# Patient Record
Sex: MALE | Race: WHITE | NOT HISPANIC OR LATINO | Employment: FULL TIME | URBAN - METROPOLITAN AREA
[De-identification: names, ages, dates, MRNs, and addresses within clinical notes are randomized per-mention and may not be internally consistent; named-entity substitution may affect disease eponyms.]

---

## 2017-11-20 ENCOUNTER — HOSPITAL ENCOUNTER (OUTPATIENT)
Dept: LAB | Facility: MEDICAL CENTER | Age: 57
End: 2017-11-20
Attending: FAMILY MEDICINE
Payer: COMMERCIAL

## 2017-11-20 LAB
25(OH)D3 SERPL-MCNC: 26 NG/ML (ref 30–100)
ALBUMIN SERPL BCP-MCNC: 3.8 G/DL (ref 3.2–4.9)
ALBUMIN/GLOB SERPL: 1.2 G/DL
ALP SERPL-CCNC: 43 U/L (ref 30–99)
ALT SERPL-CCNC: 19 U/L (ref 2–50)
ANION GAP SERPL CALC-SCNC: 4 MMOL/L (ref 0–11.9)
AST SERPL-CCNC: 21 U/L (ref 12–45)
BASOPHILS # BLD AUTO: 1.5 % (ref 0–1.8)
BASOPHILS # BLD: 0.07 K/UL (ref 0–0.12)
BILIRUB SERPL-MCNC: 0.7 MG/DL (ref 0.1–1.5)
BUN SERPL-MCNC: 22 MG/DL (ref 8–22)
CALCIUM SERPL-MCNC: 9.5 MG/DL (ref 8.5–10.5)
CHLORIDE SERPL-SCNC: 104 MMOL/L (ref 96–112)
CHOLEST SERPL-MCNC: 231 MG/DL (ref 100–199)
CO2 SERPL-SCNC: 29 MMOL/L (ref 20–33)
CREAT SERPL-MCNC: 1 MG/DL (ref 0.5–1.4)
CRP SERPL HS-MCNC: 1.1 MG/L (ref 0–7.5)
EOSINOPHIL # BLD AUTO: 0.45 K/UL (ref 0–0.51)
EOSINOPHIL NFR BLD: 9.5 % (ref 0–6.9)
ERYTHROCYTE [DISTWIDTH] IN BLOOD BY AUTOMATED COUNT: 41.3 FL (ref 35.9–50)
GFR SERPL CREATININE-BSD FRML MDRD: >60 ML/MIN/1.73 M 2
GLOBULIN SER CALC-MCNC: 3.3 G/DL (ref 1.9–3.5)
GLUCOSE SERPL-MCNC: 93 MG/DL (ref 65–99)
HCT VFR BLD AUTO: 47.1 % (ref 42–52)
HDLC SERPL-MCNC: 56 MG/DL
HGB BLD-MCNC: 16 G/DL (ref 14–18)
IMM GRANULOCYTES # BLD AUTO: 0.01 K/UL (ref 0–0.11)
IMM GRANULOCYTES NFR BLD AUTO: 0.2 % (ref 0–0.9)
LDLC SERPL CALC-MCNC: 153 MG/DL
LYMPHOCYTES # BLD AUTO: 1.76 K/UL (ref 1–4.8)
LYMPHOCYTES NFR BLD: 37.2 % (ref 22–41)
MCH RBC QN AUTO: 30.9 PG (ref 27–33)
MCHC RBC AUTO-ENTMCNC: 34 G/DL (ref 33.7–35.3)
MCV RBC AUTO: 91.1 FL (ref 81.4–97.8)
MONOCYTES # BLD AUTO: 0.46 K/UL (ref 0–0.85)
MONOCYTES NFR BLD AUTO: 9.7 % (ref 0–13.4)
NEUTROPHILS # BLD AUTO: 1.98 K/UL (ref 1.82–7.42)
NEUTROPHILS NFR BLD: 41.9 % (ref 44–72)
NRBC # BLD AUTO: 0 K/UL
NRBC BLD AUTO-RTO: 0 /100 WBC
PLATELET # BLD AUTO: 282 K/UL (ref 164–446)
PMV BLD AUTO: 10.3 FL (ref 9–12.9)
POTASSIUM SERPL-SCNC: 4.5 MMOL/L (ref 3.6–5.5)
PROT SERPL-MCNC: 7.1 G/DL (ref 6–8.2)
PSA SERPL-MCNC: 0.42 NG/ML (ref 0–4)
RBC # BLD AUTO: 5.17 M/UL (ref 4.7–6.1)
SODIUM SERPL-SCNC: 137 MMOL/L (ref 135–145)
TRIGL SERPL-MCNC: 112 MG/DL (ref 0–149)
WBC # BLD AUTO: 4.7 K/UL (ref 4.8–10.8)

## 2017-11-20 PROCEDURE — 36415 COLL VENOUS BLD VENIPUNCTURE: CPT

## 2017-11-20 PROCEDURE — 80053 COMPREHEN METABOLIC PANEL: CPT

## 2017-11-20 PROCEDURE — 86141 C-REACTIVE PROTEIN HS: CPT

## 2017-11-20 PROCEDURE — 84153 ASSAY OF PSA TOTAL: CPT

## 2017-11-20 PROCEDURE — 80061 LIPID PANEL: CPT

## 2017-11-20 PROCEDURE — 85025 COMPLETE CBC W/AUTO DIFF WBC: CPT

## 2017-11-20 PROCEDURE — 82306 VITAMIN D 25 HYDROXY: CPT

## 2018-01-02 ENCOUNTER — HOSPITAL ENCOUNTER (OUTPATIENT)
Dept: RADIOLOGY | Facility: MEDICAL CENTER | Age: 58
End: 2018-01-02
Attending: ORTHOPAEDIC SURGERY
Payer: COMMERCIAL

## 2018-01-02 DIAGNOSIS — M75.121 COMPLETE TEAR OF RIGHT ROTATOR CUFF: ICD-10-CM

## 2018-01-02 PROCEDURE — 73221 MRI JOINT UPR EXTREM W/O DYE: CPT | Mod: RT

## 2019-02-07 ENCOUNTER — OFFICE VISIT (OUTPATIENT)
Dept: MEDICAL GROUP | Facility: PHYSICIAN GROUP | Age: 59
End: 2019-02-07
Payer: COMMERCIAL

## 2019-02-07 VITALS
TEMPERATURE: 98.5 F | BODY MASS INDEX: 27.77 KG/M2 | DIASTOLIC BLOOD PRESSURE: 74 MMHG | OXYGEN SATURATION: 99 % | SYSTOLIC BLOOD PRESSURE: 110 MMHG | HEIGHT: 72 IN | HEART RATE: 62 BPM | WEIGHT: 205 LBS

## 2019-02-07 DIAGNOSIS — M19.011 ARTHRITIS OF RIGHT SHOULDER REGION: ICD-10-CM

## 2019-02-07 DIAGNOSIS — E78.5 DYSLIPIDEMIA: ICD-10-CM

## 2019-02-07 DIAGNOSIS — E66.3 OVERWEIGHT (BMI 25.0-29.9): ICD-10-CM

## 2019-02-07 DIAGNOSIS — Z12.5 SCREENING FOR PROSTATE CANCER: ICD-10-CM

## 2019-02-07 DIAGNOSIS — N40.1 BENIGN PROSTATIC HYPERPLASIA WITH NOCTURIA: ICD-10-CM

## 2019-02-07 DIAGNOSIS — N52.9 ERECTILE DYSFUNCTION, UNSPECIFIED ERECTILE DYSFUNCTION TYPE: ICD-10-CM

## 2019-02-07 DIAGNOSIS — R35.1 BENIGN PROSTATIC HYPERPLASIA WITH NOCTURIA: ICD-10-CM

## 2019-02-07 PROCEDURE — 99204 OFFICE O/P NEW MOD 45 MIN: CPT | Performed by: FAMILY MEDICINE

## 2019-02-07 RX ORDER — TAMSULOSIN HYDROCHLORIDE 0.4 MG/1
0.4 CAPSULE ORAL
Qty: 30 CAP | Refills: 0 | Status: SHIPPED | OUTPATIENT
Start: 2019-02-07 | End: 2019-03-11 | Stop reason: SDUPTHER

## 2019-02-07 ASSESSMENT — PATIENT HEALTH QUESTIONNAIRE - PHQ9: CLINICAL INTERPRETATION OF PHQ2 SCORE: 0

## 2019-02-07 NOTE — ASSESSMENT & PLAN NOTE
New problem to examiner.  Patient with a history of right shoulder arthritis previously diagnosed on MRI by evaluation with Dr. Pinto.  Patient had since elected not to have any surgery done at this time and has had rare exacerbations of the shoulder arthritis.  States that he still able to golf and go to the gym as he would like.

## 2019-02-07 NOTE — ASSESSMENT & PLAN NOTE
Problem to examiner.  Patient previously tested for cholesterol by previous provider Dr. Stevie Burt.  Old records demonstrate elevated LDL cholesterol with HDL 55.  ASCVD risk of 6% for the next 10 years for heart attack or stroke with optimal risk of 4.1%.

## 2019-02-07 NOTE — PROGRESS NOTES
CC:  Diagnoses of Erectile dysfunction, unspecified erectile dysfunction type, Benign prostatic hyperplasia with nocturia, Dyslipidemia, Screening for prostate cancer, Overweight (BMI 25.0-29.9), and Arthritis of right shoulder region were pertinent to this visit.    HISTORY OF THE PRESENT ILLNESS: Patient is a 58 y.o. male. This pleasant patient is here today to establish new PCP.    Health Maintenance: Completed      Benign prostatic hyperplasia with nocturia  New problem to examiner.  Patient presents with 2 episodes of nocturia nightly and intermittent history of weak stream and polyuria if he drinks too much water during the day.  Denies any dysuria, foul-smelling urine, hematuria.    Erectile dysfunction  New problem to examiner.  Patient complains of difficulty obtaining and maintaining an erection.  States that when he does get an erection it does not last until termination of intercourse or climax.  And it does not as firm as previously experienced.    Dyslipidemia  Problem to examiner.  Patient previously tested for cholesterol by previous provider Dr. Stevie Burt.  Old records demonstrate elevated LDL cholesterol with HDL 55.  ASCVD risk of 6% for the next 10 years for heart attack or stroke with optimal risk of 4.1%.    Overweight (BMI 25.0-29.9)  New problem to examiner.  Patient with BMI of 27.  Likely related to high muscle mass.  Patient is active with golfing and exercise 3 days/week.    Arthritis of right shoulder region  New problem to examiner.  Patient with a history of right shoulder arthritis previously diagnosed on MRI by evaluation with Dr. Pinto.  Patient had since elected not to have any surgery done at this time and has had rare exacerbations of the shoulder arthritis.  States that he still able to golf and go to the gym as he would like.    Allergies: Patient has no known allergies.    Current Outpatient Prescriptions Ordered in [facility]   Medication Sig Dispense Refill   • tamsulosin  (FLOMAX) 0.4 MG capsule Take 1 Cap by mouth ONE-HALF HOUR AFTER BREAKFAST. 30 Cap 0     No current Epic-ordered facility-administered medications on file.        Past Medical History:   Diagnosis Date   • Asthma     in youth       Past Surgical History:   Procedure Laterality Date   • DENTAL EXTRACTION(S)     • EAR MIDDLE EXPLORATION Left     stapes fusion   • EYE SURGERY      LASIK       Social History   Substance Use Topics   • Smoking status: Never Smoker   • Smokeless tobacco: Never Used   • Alcohol use Yes      Comment: glass wine weekly       Social History     Social History Narrative   • No narrative on file       Family History   Problem Relation Age of Onset   • Lung Disease Mother         COPD/smoking   • Heart Disease Father    • Alcohol/Drug Father    • Alcohol/Drug Sister    • No Known Problems Maternal Grandmother    • Lung Disease Paternal Grandmother         COPD/Smoking       ROS:   Resp: No Shortness of breath  CV: No Chest pain  GI: No Nausea/Vomiting    All remaining systems reviewed and found to be negative, except as stated above.          Exam: Blood pressure 110/74, pulse 62, temperature 36.9 °C (98.5 °F), temperature source Temporal, height 1.829 m (6'), weight 93 kg (205 lb), SpO2 99 %. Body mass index is 27.8 kg/m².    GENERAL: No acute distress  HENT: Atraumatic, normocephalic, external auditory canals clear bilaterally, TMs translucent and pearly gray, nares clear without discharge, posterior pharynx clear without erythema or exudates.  Decreased hearing left ear with hearing aid in place.  EYES: Extraocular movements intact, pupils equal and reactive to light.  NECK: Supple, FROM  CHEST: No deformities, Equal chest expansion, regular rate and rhythm, no murmurs, gallops, or rubs.  RESP: Unlabored, no stridor or audible wheeze.  No wheezes, crackles, nor rhonchi  ABD: Soft, Nontender, Non-Distended.  Normal bowel sounds.  No hepatosplenomegaly  Extremities: No Clubbing, Cyanosis, or  Edema.  Skin: Warm/dry, without rashes  Neuro: A/O x 4, CN 2-12 Grossly intact, Motor/sensory grossly intact  Psych: Normal behavior, normal affect      Lab review:  orders written for new lab studies as appropriate; see orders    Assessment/Plan:  1. Erectile dysfunction, unspecified erectile dysfunction type  New problem to examiner.  Labs as below.  Counseled on psychiatric/stress complications that may affect erectile dysfunction and encouraged discussing possibilities with significant other.  Follow-up in 1-2 months.  - CBC WITH DIFFERENTIAL; Future  - Comp Metabolic Panel; Future  - LIPID PANEL  - TESTOSTERONE SERUM; Future  - TSH WITH REFLEX TO FT4; Future    2. Benign prostatic hyperplasia with nocturia  New problem to examiner.  Patient with 1-2 episodes of nocturia nightly.  Trial of tamsulosin.  Follow-up in 1-2 months.    3. Dyslipidemia  New problem to examiner.  Dyslipidemia with ASCVD risk of 6% over the next 10 years.  No cholesterol-lowering medication therapy indicated at this time peer    4. Screening for prostate cancer  - PROSTATE SPECIFIC AG SCREENING; Future    5.  Overweight  New problem to examiner.  Counseled on diet, exercise, and lifestyle changes to help improve weight.    6.  Right shoulder arthritis  New problem to examiner.  Counseled on at home physical therapy, rest, ice, NSAIDs as needed.      Please note that this dictation was created using voice recognition software. I have made every reasonable attempt to correct obvious errors, but I expect that there are errors of grammar and possibly content that I did not discover before finalizing the note.

## 2019-02-07 NOTE — ASSESSMENT & PLAN NOTE
New problem to examiner.  Patient with BMI of 27.  Likely related to high muscle mass.  Patient is active with golfing and exercise 3 days/week.

## 2019-02-07 NOTE — ASSESSMENT & PLAN NOTE
New problem to examiner.  Patient complains of difficulty obtaining and maintaining an erection.  States that when he does get an erection it does not last until termination of intercourse or climax.  And it does not as firm as previously experienced.

## 2019-02-07 NOTE — ASSESSMENT & PLAN NOTE
New problem to examiner.  Patient presents with 2 episodes of nocturia nightly and intermittent history of weak stream and polyuria if he drinks too much water during the day.  Denies any dysuria, foul-smelling urine, hematuria.

## 2019-02-07 NOTE — LETTER
[facility]  Edmond Bustamante M.D.  [address] [place] [address]  Fax: [phone]   Authorization for Release/Disclosure of   Protected Health Information   Name: ULI BYNUM : 1960 SSN: [id number]   Address: [address] [place] [address] [address] [place] [address] Phone:    [phone] (home)    I authorize the entity listed below to release/disclose the PHI below to:   [facility]/Edmond Bustamante M.D. and Edmond Bustamante M.D.   Provider or Entity Name:  [facility]    Address   [facility], [facility], [facility]   Phone:      Fax: [phone]     Reason for request: continuity of care   Information to be released:    [  ] LAST COLONOSCOPY,  including any PATH REPORT and follow-up  [  ] LAST FIT/COLOGUARD RESULT [  ] LAST DEXA  [  ] LAST MAMMOGRAM  [  ] LAST PAP  [  ] LAST LABS [  ] RETINA EXAM REPORT  [  ] IMMUNIZATION RECORDS  [XXX  ] Release all info      [  ] Check here and initial the line next to each item to release ALL health information INCLUDING  _____ Care and treatment for drug and / or alcohol abuse  _____ HIV testing, infection status, or AIDS  _____ Genetic Testing    DATES OF SERVICE OR TIME PERIOD TO BE DISCLOSED: _____________  I understand and acknowledge that:  * This Authorization may be revoked at any time by you in writing, except if your health information has already been used or disclosed.  * Your health information that will be used or disclosed as a result of you signing this authorization could be re-disclosed by the recipient. If this occurs, your re-disclosed health information may no longer be protected by State or Federal laws.  * You may refuse to sign this Authorization. Your refusal will not affect your ability to obtain treatment.  * This Authorization becomes effective upon signing and will  on (date) __________.      If no date is indicated, this Authorization will  one (1) year from the signature date.    Name: Uli Bynum    Signature:   Date:          2/7/2019       PLEASE FAX REQUESTED RECORDS BACK TO: [phone]

## 2019-02-07 NOTE — LETTER
[facility]  Edmond Bustamante M.D.  [address] [place] [address]  Fax: [phone]   Authorization for Release/Disclosure of   Protected Health Information   Name: ULI BYNUM : 1960 SSN: [id number]   Address: [address] [place] [address] [address] [place] [address] Phone:    [phone] (home)    I authorize the entity listed below to release/disclose the PHI below to:   [facility]/Edmond Bustamante M.D. and Edmond Bustamante M.D.   Provider or Entity Name:  [facility]    Address   City, State, Zip   Phone:      Fax:     Reason for request: continuity of care   Information to be released:    [XX  ] LAST COLONOSCOPY,  including any PATH REPORT and follow-up  [  ] LAST FIT/COLOGUARD RESULT [  ] LAST DEXA  [  ] LAST MAMMOGRAM  [  ] LAST PAP  [  ] LAST LABS [  ] RETINA EXAM REPORT  [  ] IMMUNIZATION RECORDS  [XX  ] Release all info      [  ] Check here and initial the line next to each item to release ALL health information INCLUDING  _____ Care and treatment for drug and / or alcohol abuse  _____ HIV testing, infection status, or AIDS  _____ Genetic Testing    DATES OF SERVICE OR TIME PERIOD TO BE DISCLOSED: _____________  I understand and acknowledge that:  * This Authorization may be revoked at any time by you in writing, except if your health information has already been used or disclosed.  * Your health information that will be used or disclosed as a result of you signing this authorization could be re-disclosed by the recipient. If this occurs, your re-disclosed health information may no longer be protected by State or Federal laws.  * You may refuse to sign this Authorization. Your refusal will not affect your ability to obtain treatment.  * This Authorization becomes effective upon signing and will  on (date) __________.      If no date is indicated, this Authorization will  one (1) year from the signature date.    Name: Uli Bynum    Signature: continuity of care   Date:          2/7/2019       PLEASE FAX REQUESTED RECORDS BACK TO: [phone]

## 2019-02-08 ENCOUNTER — HOSPITAL ENCOUNTER (OUTPATIENT)
Dept: LAB | Facility: MEDICAL CENTER | Age: 59
End: 2019-02-08
Attending: FAMILY MEDICINE
Payer: COMMERCIAL

## 2019-02-08 DIAGNOSIS — Z12.5 SCREENING FOR PROSTATE CANCER: ICD-10-CM

## 2019-02-08 DIAGNOSIS — N52.9 ERECTILE DYSFUNCTION, UNSPECIFIED ERECTILE DYSFUNCTION TYPE: ICD-10-CM

## 2019-02-08 LAB
ALBUMIN SERPL BCP-MCNC: 3.9 G/DL (ref 3.2–4.9)
ALBUMIN/GLOB SERPL: 1.4 G/DL
ALP SERPL-CCNC: 49 U/L (ref 30–99)
ALT SERPL-CCNC: 31 U/L (ref 2–50)
ANION GAP SERPL CALC-SCNC: 6 MMOL/L (ref 0–11.9)
AST SERPL-CCNC: 29 U/L (ref 12–45)
BASOPHILS # BLD AUTO: 2.2 % (ref 0–1.8)
BASOPHILS # BLD: 0.08 K/UL (ref 0–0.12)
BILIRUB SERPL-MCNC: 0.6 MG/DL (ref 0.1–1.5)
BUN SERPL-MCNC: 24 MG/DL (ref 8–22)
CALCIUM SERPL-MCNC: 9.6 MG/DL (ref 8.5–10.5)
CHLORIDE SERPL-SCNC: 105 MMOL/L (ref 96–112)
CO2 SERPL-SCNC: 29 MMOL/L (ref 20–33)
CREAT SERPL-MCNC: 0.98 MG/DL (ref 0.5–1.4)
EOSINOPHIL # BLD AUTO: 0.42 K/UL (ref 0–0.51)
EOSINOPHIL NFR BLD: 11.7 % (ref 0–6.9)
ERYTHROCYTE [DISTWIDTH] IN BLOOD BY AUTOMATED COUNT: 44 FL (ref 35.9–50)
GLOBULIN SER CALC-MCNC: 2.7 G/DL (ref 1.9–3.5)
GLUCOSE SERPL-MCNC: 97 MG/DL (ref 65–99)
HCT VFR BLD AUTO: 44.8 % (ref 42–52)
HGB BLD-MCNC: 14.8 G/DL (ref 14–18)
IMM GRANULOCYTES # BLD AUTO: 0 K/UL (ref 0–0.11)
IMM GRANULOCYTES NFR BLD AUTO: 0 % (ref 0–0.9)
LYMPHOCYTES # BLD AUTO: 1.14 K/UL (ref 1–4.8)
LYMPHOCYTES NFR BLD: 31.8 % (ref 22–41)
MCH RBC QN AUTO: 31.4 PG (ref 27–33)
MCHC RBC AUTO-ENTMCNC: 33 G/DL (ref 33.7–35.3)
MCV RBC AUTO: 94.9 FL (ref 81.4–97.8)
MONOCYTES # BLD AUTO: 0.68 K/UL (ref 0–0.85)
MONOCYTES NFR BLD AUTO: 19 % (ref 0–13.4)
NEUTROPHILS # BLD AUTO: 1.26 K/UL (ref 1.82–7.42)
NEUTROPHILS NFR BLD: 35.3 % (ref 44–72)
NRBC # BLD AUTO: 0 K/UL
NRBC BLD-RTO: 0 /100 WBC
PLATELET # BLD AUTO: 193 K/UL (ref 164–446)
PMV BLD AUTO: 10.6 FL (ref 9–12.9)
POTASSIUM SERPL-SCNC: 4.4 MMOL/L (ref 3.6–5.5)
PROT SERPL-MCNC: 6.6 G/DL (ref 6–8.2)
PSA SERPL-MCNC: 0.4 NG/ML (ref 0–4)
RBC # BLD AUTO: 4.72 M/UL (ref 4.7–6.1)
SODIUM SERPL-SCNC: 140 MMOL/L (ref 135–145)
TESTOST SERPL-MCNC: 362 NG/DL (ref 175–781)
TSH SERPL DL<=0.005 MIU/L-ACNC: 2.39 UIU/ML (ref 0.38–5.33)
WBC # BLD AUTO: 3.6 K/UL (ref 4.8–10.8)

## 2019-02-08 PROCEDURE — 36415 COLL VENOUS BLD VENIPUNCTURE: CPT

## 2019-02-08 PROCEDURE — 84443 ASSAY THYROID STIM HORMONE: CPT

## 2019-02-08 PROCEDURE — 84403 ASSAY OF TOTAL TESTOSTERONE: CPT

## 2019-02-08 PROCEDURE — 84153 ASSAY OF PSA TOTAL: CPT

## 2019-02-08 PROCEDURE — 80053 COMPREHEN METABOLIC PANEL: CPT

## 2019-02-08 PROCEDURE — 85025 COMPLETE CBC W/AUTO DIFF WBC: CPT

## 2019-02-22 ENCOUNTER — HOSPITAL ENCOUNTER (OUTPATIENT)
Dept: LAB | Facility: MEDICAL CENTER | Age: 59
End: 2019-02-22
Attending: FAMILY MEDICINE
Payer: COMMERCIAL

## 2019-02-22 LAB
CHOLEST SERPL-MCNC: 191 MG/DL (ref 100–199)
FASTING STATUS PATIENT QL REPORTED: NORMAL
HDLC SERPL-MCNC: 45 MG/DL
LDLC SERPL CALC-MCNC: 116 MG/DL
TRIGL SERPL-MCNC: 149 MG/DL (ref 0–149)

## 2019-02-22 PROCEDURE — 80061 LIPID PANEL: CPT

## 2019-02-22 PROCEDURE — 36415 COLL VENOUS BLD VENIPUNCTURE: CPT

## 2019-03-12 RX ORDER — TAMSULOSIN HYDROCHLORIDE 0.4 MG/1
0.4 CAPSULE ORAL
Qty: 90 CAP | Refills: 3 | Status: SHIPPED | OUTPATIENT
Start: 2019-03-12 | End: 2019-11-08 | Stop reason: SDUPTHER

## 2019-04-02 ENCOUNTER — OFFICE VISIT (OUTPATIENT)
Dept: MEDICAL GROUP | Facility: PHYSICIAN GROUP | Age: 59
End: 2019-04-02
Payer: COMMERCIAL

## 2019-04-02 VITALS
BODY MASS INDEX: 27.8 KG/M2 | OXYGEN SATURATION: 97 % | SYSTOLIC BLOOD PRESSURE: 112 MMHG | TEMPERATURE: 98.4 F | HEIGHT: 72 IN | DIASTOLIC BLOOD PRESSURE: 72 MMHG | HEART RATE: 69 BPM

## 2019-04-02 DIAGNOSIS — R35.1 BENIGN PROSTATIC HYPERPLASIA WITH NOCTURIA: ICD-10-CM

## 2019-04-02 DIAGNOSIS — N40.1 BENIGN PROSTATIC HYPERPLASIA WITH NOCTURIA: ICD-10-CM

## 2019-04-02 DIAGNOSIS — J45.20 MILD INTERMITTENT ASTHMA WITHOUT COMPLICATION: ICD-10-CM

## 2019-04-02 DIAGNOSIS — N52.9 ERECTILE DYSFUNCTION, UNSPECIFIED ERECTILE DYSFUNCTION TYPE: ICD-10-CM

## 2019-04-02 PROCEDURE — 99214 OFFICE O/P EST MOD 30 MIN: CPT | Performed by: FAMILY MEDICINE

## 2019-04-02 RX ORDER — ALBUTEROL SULFATE 90 UG/1
2 AEROSOL, METERED RESPIRATORY (INHALATION) EVERY 6 HOURS PRN
Qty: 8.5 G | Refills: 0 | Status: SHIPPED | OUTPATIENT
Start: 2019-04-02 | End: 2021-02-12 | Stop reason: SDUPTHER

## 2019-04-02 RX ORDER — SILDENAFIL 50 MG/1
50 TABLET, FILM COATED ORAL PRN
Qty: 30 TAB | Refills: 3 | Status: SHIPPED | OUTPATIENT
Start: 2019-04-02 | End: 2020-07-23 | Stop reason: SDUPTHER

## 2019-04-02 ASSESSMENT — PAIN SCALES - GENERAL: PAINLEVEL: NO PAIN

## 2019-04-02 NOTE — ASSESSMENT & PLAN NOTE
New problem to examiner.  Patient with intermittent asthma that seems to be exacerbated by cold or exercise.  Patient has to use an albuterol inhaler once every 6 months for this.  Patient is requesting renewal of his albuterol inhaler today.  Denies any chest pain, shortness of breath, wheezing today.

## 2019-04-02 NOTE — ASSESSMENT & PLAN NOTE
Chronic ongoing medical condition.  Reviewed labs with patient today which are largely normal the patient continues to have issues with erectile dysfunction.  Both obtaining and maintaining an erection.  Patient requesting Viagra today.  Patient states that he has continued symptoms of erectile dysfunction even when he is masturbating.

## 2019-04-02 NOTE — ASSESSMENT & PLAN NOTE
Chronic ongoing medical condition.  Significantly improved with tamsulosin 0.4 mg daily.  Finds that he is urinating less at night and has a better stream and less hesitancy with urination.

## 2019-04-02 NOTE — PROGRESS NOTES
CC:Diagnoses of Mild intermittent asthma without complication, Erectile dysfunction, unspecified erectile dysfunction type, and Benign prostatic hyperplasia with nocturia were pertinent to this visit.      HISTORY OF PRESENT ILLNESS: Patient is a 58 y.o. male established patient who presents today to follow-up on erectile dysfunction and asthma.      Mild intermittent asthma without complication  New problem to examiner.  Patient with intermittent asthma that seems to be exacerbated by cold or exercise.  Patient has to use an albuterol inhaler once every 6 months for this.  Patient is requesting renewal of his albuterol inhaler today.  Denies any chest pain, shortness of breath, wheezing today.    Erectile dysfunction  Chronic ongoing medical condition.  Reviewed labs with patient today which are largely normal the patient continues to have issues with erectile dysfunction.  Both obtaining and maintaining an erection.  Patient requesting Viagra today.  Patient states that he has continued symptoms of erectile dysfunction even when he is masturbating.    Benign prostatic hyperplasia with nocturia  Chronic ongoing medical condition.  Significantly improved with tamsulosin 0.4 mg daily.  Finds that he is urinating less at night and has a better stream and less hesitancy with urination.      Patient Active Problem List    Diagnosis Date Noted   • Mild intermittent asthma without complication 04/02/2019   • Erectile dysfunction 02/07/2019   • Benign prostatic hyperplasia with nocturia 02/07/2019   • Dyslipidemia 02/07/2019   • Overweight (BMI 25.0-29.9) 02/07/2019   • Arthritis of right shoulder region 02/07/2019      Allergies:Patient has no known allergies.    Current Outpatient Prescriptions   Medication Sig Dispense Refill   • albuterol 108 (90 Base) MCG/ACT Aero Soln inhalation aerosol Inhale 2 Puffs by mouth every 6 hours as needed for Shortness of Breath. 8.5 g 0   • sildenafil citrate (VIAGRA) 50 MG tablet Take 1  Tab by mouth as needed for Erectile Dysfunction. 30 Tab 3   • tamsulosin (FLOMAX) 0.4 MG capsule Take 1 Cap by mouth ONE-HALF HOUR AFTER BREAKFAST. 90 Cap 3     No current facility-administered medications for this visit.        Social History   Substance Use Topics   • Smoking status: Never Smoker   • Smokeless tobacco: Never Used   • Alcohol use Yes      Comment: glass wine weekly     Social History     Social History Narrative   • No narrative on file       Family History   Problem Relation Age of Onset   • Lung Disease Mother         COPD/smoking   • Heart Disease Father    • Alcohol/Drug Father    • Alcohol/Drug Sister    • No Known Problems Maternal Grandmother    • Lung Disease Paternal Grandmother         COPD/Smoking       Review of Systems:    Constitutional: No Fevers, Chills  Eyes: No vision changes  ENT: No hearing changes  Resp: No Shortness of breath  CV: No Chest pain  GI: No Nausea/Vomiting  MSK: No weakness  Skin: No rashes  Neuro: No Headaches  Psych: No Suicidal ideations    All remaining systems reviewed and found to be negative, except as stated above.    Exam:    Blood pressure 112/72, pulse 69, temperature 36.9 °C (98.4 °F), height 1.829 m (6'), SpO2 97 %. Body mass index is 27.8 kg/m².    General:  Well nourished, well developed male in NAD  HENT: Atraumatic, normocephalic  EYES: Extraocular movements intact, pupils equal and reactive to light  NECK: Supple, FROM  CHEST: No deformities, Equal chest expansion  RESP: Unlabored, no stridor or audible wheeze  ABD: Soft, Nontender, Non-Distended  Extremities: No Clubbing, Cyanosis, or Edema  Skin: Warm/dry, without rashes  Neuro: A/O x 4, CN 2-12 Grossly intact, Motor/sensory grossly intact  Psych: Normal behavior, normal affect      Assessment/Plan:  1. Mild intermittent asthma without complication  New problem to examiner.  Prescription for albuterol sent to pharmacy.    2. Erectile dysfunction, unspecified erectile dysfunction type  Chronic  ongoing medical condition.  Prescription for Viagra printed and given to patient along with good Rx coupon.  Counseled on possible side effects and absolute indications to present to the emergency room.    3. Benign prostatic hyperplasia with nocturia  Chronic ongoing medical condition.  Currently improved on tamsulosin.  Counseled on follow-up for changes in urinary habits or worsening nocturia, hesitancy, weak stream.    Please note that this dictation was created using voice recognition software. I have worked with consultants from the vendor as well as technical experts from [employer] to optimize the interface. I have made every reasonable attempt to correct obvious errors, but I expect that there are errors of grammar and possibly content that I did not discover before finalizing the note.

## 2019-10-18 ENCOUNTER — OFFICE VISIT (OUTPATIENT)
Dept: MEDICAL GROUP | Facility: PHYSICIAN GROUP | Age: 59
End: 2019-10-18
Payer: COMMERCIAL

## 2019-10-18 VITALS
BODY MASS INDEX: 27.9 KG/M2 | WEIGHT: 206 LBS | TEMPERATURE: 97.8 F | HEART RATE: 63 BPM | OXYGEN SATURATION: 97 % | DIASTOLIC BLOOD PRESSURE: 74 MMHG | HEIGHT: 72 IN | SYSTOLIC BLOOD PRESSURE: 110 MMHG

## 2019-10-18 DIAGNOSIS — E66.3 OVERWEIGHT (BMI 25.0-29.9): ICD-10-CM

## 2019-10-18 DIAGNOSIS — N40.1 BENIGN PROSTATIC HYPERPLASIA WITH NOCTURIA: ICD-10-CM

## 2019-10-18 DIAGNOSIS — R35.1 BENIGN PROSTATIC HYPERPLASIA WITH NOCTURIA: ICD-10-CM

## 2019-10-18 DIAGNOSIS — Z23 NEED FOR VACCINATION: ICD-10-CM

## 2019-10-18 DIAGNOSIS — Z11.59 NEED FOR HEPATITIS C SCREENING TEST: ICD-10-CM

## 2019-10-18 DIAGNOSIS — E78.5 DYSLIPIDEMIA: ICD-10-CM

## 2019-10-18 PROCEDURE — 99396 PREV VISIT EST AGE 40-64: CPT | Mod: 25 | Performed by: FAMILY MEDICINE

## 2019-10-18 PROCEDURE — 90472 IMMUNIZATION ADMIN EACH ADD: CPT | Performed by: FAMILY MEDICINE

## 2019-10-18 PROCEDURE — 90686 IIV4 VACC NO PRSV 0.5 ML IM: CPT | Performed by: FAMILY MEDICINE

## 2019-10-18 PROCEDURE — 90732 PPSV23 VACC 2 YRS+ SUBQ/IM: CPT | Performed by: FAMILY MEDICINE

## 2019-10-18 PROCEDURE — 90471 IMMUNIZATION ADMIN: CPT | Performed by: FAMILY MEDICINE

## 2019-10-18 ASSESSMENT — PAIN SCALES - GENERAL: PAINLEVEL: NO PAIN

## 2019-10-18 NOTE — PROGRESS NOTES
CC:Diagnoses of Need for vaccination, Dyslipidemia, Need for hepatitis C screening test, Overweight (BMI 25.0-29.9), and Benign prostatic hyperplasia with nocturia were pertinent to this visit.      HISTORY OF PRESENT ILLNESS: Patient is a 59 y.o. male established patient who presents today annual wellness and preventative visit.    Health Maintenance: Completed, received PPV 23 under 65 due to asthma, flu shot given today.  Patient believes that he is up-to-date on his Tdap but is working on obtaining records.    Patient Active Problem List    Diagnosis Date Noted   • Mild intermittent asthma without complication 04/02/2019   • Erectile dysfunction 02/07/2019   • Benign prostatic hyperplasia with nocturia 02/07/2019   • Dyslipidemia 02/07/2019   • Overweight (BMI 25.0-29.9) 02/07/2019   • Arthritis of right shoulder region 02/07/2019      Allergies:Patient has no known allergies.    Current Outpatient Medications   Medication Sig Dispense Refill   • Zoster Vac Recomb Adjuvanted (SHINGRIX) 50 MCG/0.5ML Recon Susp 0.5 mL by Intramuscular route Once for 1 dose. 0.5 mL 1   • albuterol 108 (90 Base) MCG/ACT Aero Soln inhalation aerosol Inhale 2 Puffs by mouth every 6 hours as needed for Shortness of Breath. 8.5 g 0   • sildenafil citrate (VIAGRA) 50 MG tablet Take 1 Tab by mouth as needed for Erectile Dysfunction. 30 Tab 3   • tamsulosin (FLOMAX) 0.4 MG capsule Take 1 Cap by mouth ONE-HALF HOUR AFTER BREAKFAST. 90 Cap 3     No current facility-administered medications for this visit.        Social History     Tobacco Use   • Smoking status: Never Smoker   • Smokeless tobacco: Never Used   Substance Use Topics   • Alcohol use: Yes     Comment: glass wine weekly   • Drug use: No     Social History     Social History Narrative   • Not on file       Family History   Problem Relation Age of Onset   • Lung Disease Mother         COPD/smoking   • Heart Disease Father    • Alcohol/Drug Father    • Alcohol/Drug Sister    • No  Known Problems Maternal Grandmother    • Lung Disease Paternal Grandmother         COPD/Smoking       Review of Systems:    Constitutional: No Fevers, Chills  Eyes: No vision changes  ENT: No hearing changes  Resp: No Shortness of breath  CV: No Chest pain  GI: No Nausea/Vomiting  MSK: No weakness  Skin: No rashes  Neuro: No Headaches  Psych: No Suicidal ideations    All remaining systems reviewed and found to be negative, except as stated above.    Exam:    /74 (BP Location: Left arm, Patient Position: Sitting, BP Cuff Size: Adult)   Pulse 63   Temp 36.6 °C (97.8 °F)   Ht 1.829 m (6')   Wt 93.4 kg (206 lb)   SpO2 97%  Body mass index is 27.94 kg/m².    General:  Well nourished, well developed male in NAD  HENT: Atraumatic, normocephalic  EYES: Extraocular movements intact, pupils equal and reactive to light  NECK: Supple, FROM  CHEST: No deformities, Equal chest expansion  RESP: Unlabored, no stridor or audible wheeze  ABD: Soft, Nontender, Non-Distended  Extremities: No Clubbing, Cyanosis, or Edema  Skin: Warm/dry, without rashes  Neuro: A/O x 4, CN 2-12 Grossly intact, Motor/sensory grossly intact  Psych: Normal behavior, normal affect      LABS: February 2019: Results reviewed and discussed with the patient, questions answered.      Assessment/Plan:  1. Need for vaccination  - Influenza Vaccine Quad Injection (PF)  - Pneumococal Polysaccharide Vaccine 23-Valent =>3YO SQ/IM  - Zoster Vac Recomb Adjuvanted (SHINGRIX) 50 MCG/0.5ML Recon Susp; 0.5 mL by Intramuscular route Once for 1 dose.  Dispense: 0.5 mL; Refill: 1    2. Dyslipidemia  Currently subthreshold for treatment.  Labs as below to reevaluate cardiac risk.  - Lipid Profile; Future  - Comp Metabolic Panel; Future  - CBC WITH DIFFERENTIAL; Future    3. Need for hepatitis C screening test  - HEP C VIRUS ANTIBODY; Future    4. Overweight (BMI 25.0-29.9)  Counseled on diet, exercise, and lifestyle changes to maintain a healthy weight.  Patient has  healthy diet with adequate exercise.    5. Benign prostatic hyperplasia with nocturia  Chronic ongoing medical condition.  Currently moderately well controlled.  Patient still has nocturia 2 times per night and is requesting an increase in his tamsulosin.  Counseled on taking tamsulosin 0.4 mg 2 tabs per day.  Follow-up as needed.    Follow-up in 1 year or sooner.  Labs prior to follow-up.      Please note that this dictation was created using voice recognition software. I have worked with consultants from the vendor as well as technical experts from [employer] to optimize the interface. I have made every reasonable attempt to correct obvious errors, but I expect that there are errors of grammar and possibly content that I did not discover before finalizing the note.

## 2019-11-08 DIAGNOSIS — R35.1 BENIGN PROSTATIC HYPERPLASIA WITH NOCTURIA: ICD-10-CM

## 2019-11-08 DIAGNOSIS — N40.1 BENIGN PROSTATIC HYPERPLASIA WITH NOCTURIA: ICD-10-CM

## 2019-11-08 RX ORDER — TAMSULOSIN HYDROCHLORIDE 0.4 MG/1
0.8 CAPSULE ORAL
Qty: 180 CAP | Refills: 4 | Status: SHIPPED | OUTPATIENT
Start: 2019-11-08 | End: 2021-02-16

## 2019-11-08 NOTE — TELEPHONE ENCOUNTER
Was the patient seen in the last year in this department? Yes LOV 10/18/19    Does patient have an active prescription for medications requested? No     Received Request Via: Patient See [employer] Message 10/19/19.  Pt called for refill asked him to send us a [email or web address] message to update how the 0.8 mg is having any improvments

## 2020-02-27 ENCOUNTER — HOSPITAL ENCOUNTER (OUTPATIENT)
Dept: LAB | Facility: MEDICAL CENTER | Age: 60
End: 2020-02-27
Attending: FAMILY MEDICINE
Payer: COMMERCIAL

## 2020-02-27 DIAGNOSIS — E78.5 DYSLIPIDEMIA: ICD-10-CM

## 2020-02-27 DIAGNOSIS — Z11.59 NEED FOR HEPATITIS C SCREENING TEST: ICD-10-CM

## 2020-02-27 LAB
ALBUMIN SERPL BCP-MCNC: 3.9 G/DL (ref 3.2–4.9)
ALBUMIN/GLOB SERPL: 1.3 G/DL
ALP SERPL-CCNC: 51 U/L (ref 30–99)
ALT SERPL-CCNC: 26 U/L (ref 2–50)
ANION GAP SERPL CALC-SCNC: 6 MMOL/L (ref 0–11.9)
AST SERPL-CCNC: 24 U/L (ref 12–45)
BASOPHILS # BLD AUTO: 1.2 % (ref 0–1.8)
BASOPHILS # BLD: 0.04 K/UL (ref 0–0.12)
BILIRUB SERPL-MCNC: 0.7 MG/DL (ref 0.1–1.5)
BUN SERPL-MCNC: 24 MG/DL (ref 8–22)
CALCIUM SERPL-MCNC: 9.1 MG/DL (ref 8.5–10.5)
CHLORIDE SERPL-SCNC: 103 MMOL/L (ref 96–112)
CHOLEST SERPL-MCNC: 238 MG/DL (ref 100–199)
CO2 SERPL-SCNC: 28 MMOL/L (ref 20–33)
CREAT SERPL-MCNC: 1.08 MG/DL (ref 0.5–1.4)
EOSINOPHIL # BLD AUTO: 0.36 K/UL (ref 0–0.51)
EOSINOPHIL NFR BLD: 10.4 % (ref 0–6.9)
ERYTHROCYTE [DISTWIDTH] IN BLOOD BY AUTOMATED COUNT: 44.5 FL (ref 35.9–50)
FASTING STATUS PATIENT QL REPORTED: NORMAL
GLOBULIN SER CALC-MCNC: 3.1 G/DL (ref 1.9–3.5)
GLUCOSE SERPL-MCNC: 97 MG/DL (ref 65–99)
HCT VFR BLD AUTO: 45.8 % (ref 42–52)
HCV AB SER QL: NEGATIVE
HDLC SERPL-MCNC: 53 MG/DL
HGB BLD-MCNC: 15.1 G/DL (ref 14–18)
IMM GRANULOCYTES # BLD AUTO: 0.01 K/UL (ref 0–0.11)
IMM GRANULOCYTES NFR BLD AUTO: 0.3 % (ref 0–0.9)
LDLC SERPL CALC-MCNC: 161 MG/DL
LYMPHOCYTES # BLD AUTO: 1.19 K/UL (ref 1–4.8)
LYMPHOCYTES NFR BLD: 34.3 % (ref 22–41)
MCH RBC QN AUTO: 30.9 PG (ref 27–33)
MCHC RBC AUTO-ENTMCNC: 33 G/DL (ref 33.7–35.3)
MCV RBC AUTO: 93.7 FL (ref 81.4–97.8)
MONOCYTES # BLD AUTO: 0.37 K/UL (ref 0–0.85)
MONOCYTES NFR BLD AUTO: 10.7 % (ref 0–13.4)
NEUTROPHILS # BLD AUTO: 1.5 K/UL (ref 1.82–7.42)
NEUTROPHILS NFR BLD: 43.1 % (ref 44–72)
NRBC # BLD AUTO: 0 K/UL
NRBC BLD-RTO: 0 /100 WBC
PLATELET # BLD AUTO: 247 K/UL (ref 164–446)
PMV BLD AUTO: 10.6 FL (ref 9–12.9)
POTASSIUM SERPL-SCNC: 4.3 MMOL/L (ref 3.6–5.5)
PROT SERPL-MCNC: 7 G/DL (ref 6–8.2)
RBC # BLD AUTO: 4.89 M/UL (ref 4.7–6.1)
SODIUM SERPL-SCNC: 137 MMOL/L (ref 135–145)
TRIGL SERPL-MCNC: 121 MG/DL (ref 0–149)
WBC # BLD AUTO: 3.5 K/UL (ref 4.8–10.8)

## 2020-02-27 PROCEDURE — 36415 COLL VENOUS BLD VENIPUNCTURE: CPT

## 2020-02-27 PROCEDURE — 86803 HEPATITIS C AB TEST: CPT

## 2020-02-27 PROCEDURE — 80061 LIPID PANEL: CPT

## 2020-02-27 PROCEDURE — 85025 COMPLETE CBC W/AUTO DIFF WBC: CPT

## 2020-02-27 PROCEDURE — 80053 COMPREHEN METABOLIC PANEL: CPT

## 2020-07-23 DIAGNOSIS — N52.9 ERECTILE DYSFUNCTION, UNSPECIFIED ERECTILE DYSFUNCTION TYPE: ICD-10-CM

## 2020-07-23 RX ORDER — SILDENAFIL 50 MG/1
50 TABLET, FILM COATED ORAL PRN
Qty: 30 TAB | Refills: 0 | Status: SHIPPED | OUTPATIENT
Start: 2020-07-23 | End: 2021-02-12 | Stop reason: SDUPTHER

## 2020-07-23 NOTE — TELEPHONE ENCOUNTER
Received request via: Pharmacy    Was the patient seen in the last year in this department? Yes LOV 10/18/2019    Does the patient have an active prescription (recently filled or refills available) for medication(s) requested? No

## 2020-11-06 ENCOUNTER — NON-PROVIDER VISIT (OUTPATIENT)
Dept: MEDICAL GROUP | Facility: PHYSICIAN GROUP | Age: 60
End: 2020-11-06
Payer: COMMERCIAL

## 2020-11-06 DIAGNOSIS — Z23 NEED FOR VACCINATION: ICD-10-CM

## 2020-11-06 PROCEDURE — 90471 IMMUNIZATION ADMIN: CPT | Performed by: FAMILY MEDICINE

## 2020-11-06 PROCEDURE — 90472 IMMUNIZATION ADMIN EACH ADD: CPT | Performed by: FAMILY MEDICINE

## 2020-11-06 PROCEDURE — 90715 TDAP VACCINE 7 YRS/> IM: CPT | Performed by: FAMILY MEDICINE

## 2020-11-06 PROCEDURE — 90686 IIV4 VACC NO PRSV 0.5 ML IM: CPT | Performed by: FAMILY MEDICINE

## 2020-11-06 NOTE — NON-PROVIDER
"Dex Bynum is a 60 y.o. male here for a non-provider visit for:   FLU & Tdap     Reason for immunization: Annual Flu Vaccine  Immunization records indicate need for vaccine: Yes, confirmed with Epic  Minimum interval has been met for this vaccine: Yes  ABN completed: Yes    Order and dose verified by: DT  VIS Dated  Flu - 08/16/2019 & Tdap - 04/1/2020 was given to patient: Yes  All IAC Questionnaire questions were answered \"No.\"    Patient tolerated injection and no adverse effects were observed or reported: Yes    Pt scheduled for next dose in series: Not Indicated  "

## 2021-02-06 SDOH — HEALTH STABILITY: PHYSICAL HEALTH: ON AVERAGE, HOW MANY MINUTES DO YOU ENGAGE IN EXERCISE AT THIS LEVEL?: 70 MINUTES

## 2021-02-06 SDOH — HEALTH STABILITY: PHYSICAL HEALTH: ON AVERAGE, HOW MANY DAYS PER WEEK DO YOU ENGAGE IN MODERATE TO STRENUOUS EXERCISE (LIKE A BRISK WALK)?: 5 DAYS

## 2021-02-06 SDOH — ECONOMIC STABILITY: INCOME INSECURITY: IN THE LAST 12 MONTHS, WAS THERE A TIME WHEN YOU WERE NOT ABLE TO PAY THE MORTGAGE OR RENT ON TIME?: NO

## 2021-02-06 SDOH — ECONOMIC STABILITY: HOUSING INSECURITY
IN THE LAST 12 MONTHS, WAS THERE A TIME WHEN YOU DID NOT HAVE A STEADY PLACE TO SLEEP OR SLEPT IN A SHELTER (INCLUDING NOW)?: NO

## 2021-02-06 SDOH — HEALTH STABILITY: MENTAL HEALTH
STRESS IS WHEN SOMEONE FEELS TENSE, NERVOUS, ANXIOUS, OR CAN'T SLEEP AT NIGHT BECAUSE THEIR MIND IS TROUBLED. HOW STRESSED ARE YOU?: NOT AT ALL

## 2021-02-06 SDOH — ECONOMIC STABILITY: TRANSPORTATION INSECURITY
IN THE PAST 12 MONTHS, HAS THE LACK OF TRANSPORTATION KEPT YOU FROM MEDICAL APPOINTMENTS OR FROM GETTING MEDICATIONS?: NO

## 2021-02-06 SDOH — ECONOMIC STABILITY: HOUSING INSECURITY: IN THE LAST 12 MONTHS, HOW MANY PLACES HAVE YOU LIVED?: 1

## 2021-02-06 SDOH — ECONOMIC STABILITY: TRANSPORTATION INSECURITY
IN THE PAST 12 MONTHS, HAS LACK OF RELIABLE TRANSPORTATION KEPT YOU FROM MEDICAL APPOINTMENTS, MEETINGS, WORK OR FROM GETTING THINGS NEEDED FOR DAILY LIVING?: NO

## 2021-02-06 ASSESSMENT — SOCIAL DETERMINANTS OF HEALTH (SDOH)
WITHIN THE PAST 12 MONTHS, YOU WORRIED THAT YOUR FOOD WOULD RUN OUT BEFORE YOU GOT THE MONEY TO BUY MORE: NEVER TRUE
IN A TYPICAL WEEK, HOW MANY TIMES DO YOU TALK ON THE PHONE WITH FAMILY, FRIENDS, OR NEIGHBORS?: ONCE A WEEK
DO YOU BELONG TO ANY CLUBS OR ORGANIZATIONS SUCH AS CHURCH GROUPS UNIONS, FRATERNAL OR ATHLETIC GROUPS, OR SCHOOL GROUPS?: NO
HOW HARD IS IT FOR YOU TO PAY FOR THE VERY BASICS LIKE FOOD, HOUSING, MEDICAL CARE, AND HEATING?: NOT HARD AT ALL
HOW OFTEN DO YOU ATTEND CHURCH OR RELIGIOUS SERVICES?: NEVER
HOW OFTEN DO YOU HAVE A DRINK CONTAINING ALCOHOL: 2-4 TIMES A MONTH
HOW MANY DRINKS CONTAINING ALCOHOL DO YOU HAVE ON A TYPICAL DAY WHEN YOU ARE DRINKING: 1 OR 2
HOW OFTEN DO YOU GET TOGETHER WITH FRIENDS OR RELATIVES?: ONCE A WEEK
HOW OFTEN DO YOU HAVE SIX OR MORE DRINKS ON ONE OCCASION: NEVER
WITHIN THE PAST 12 MONTHS, THE FOOD YOU BOUGHT JUST DIDN'T LAST AND YOU DIDN'T HAVE MONEY TO GET MORE: NEVER TRUE

## 2021-02-12 ENCOUNTER — TELEMEDICINE (OUTPATIENT)
Dept: MEDICAL GROUP | Facility: PHYSICIAN GROUP | Age: 61
End: 2021-02-12
Payer: COMMERCIAL

## 2021-02-12 VITALS — BODY MASS INDEX: 26.82 KG/M2 | HEIGHT: 72 IN | WEIGHT: 198 LBS

## 2021-02-12 DIAGNOSIS — N52.9 ERECTILE DYSFUNCTION, UNSPECIFIED ERECTILE DYSFUNCTION TYPE: ICD-10-CM

## 2021-02-12 DIAGNOSIS — E78.5 DYSLIPIDEMIA: ICD-10-CM

## 2021-02-12 DIAGNOSIS — R35.1 BENIGN PROSTATIC HYPERPLASIA WITH NOCTURIA: ICD-10-CM

## 2021-02-12 DIAGNOSIS — J45.20 MILD INTERMITTENT ASTHMA WITHOUT COMPLICATION: ICD-10-CM

## 2021-02-12 DIAGNOSIS — N40.1 BENIGN PROSTATIC HYPERPLASIA WITH NOCTURIA: ICD-10-CM

## 2021-02-12 DIAGNOSIS — Z12.11 SCREEN FOR COLON CANCER: ICD-10-CM

## 2021-02-12 DIAGNOSIS — Z12.5 SCREENING FOR PROSTATE CANCER: ICD-10-CM

## 2021-02-12 PROCEDURE — 99396 PREV VISIT EST AGE 40-64: CPT | Mod: 95,CR | Performed by: FAMILY MEDICINE

## 2021-02-12 RX ORDER — SILDENAFIL 50 MG/1
50 TABLET, FILM COATED ORAL PRN
Qty: 30 TABLET | Refills: 0 | Status: SHIPPED | OUTPATIENT
Start: 2021-02-12 | End: 2022-04-08 | Stop reason: SDUPTHER

## 2021-02-12 RX ORDER — FINASTERIDE 5 MG/1
5 TABLET, FILM COATED ORAL DAILY
Qty: 30 TABLET | Refills: 1 | Status: SHIPPED | OUTPATIENT
Start: 2021-02-12 | End: 2021-03-25 | Stop reason: SDUPTHER

## 2021-02-12 RX ORDER — ALBUTEROL SULFATE 90 UG/1
2 AEROSOL, METERED RESPIRATORY (INHALATION) EVERY 6 HOURS PRN
Qty: 8.5 G | Refills: 0 | Status: SHIPPED | OUTPATIENT
Start: 2021-02-12 | End: 2022-04-08 | Stop reason: SDUPTHER

## 2021-02-12 ASSESSMENT — PATIENT HEALTH QUESTIONNAIRE - PHQ9: CLINICAL INTERPRETATION OF PHQ2 SCORE: 0

## 2021-02-12 ASSESSMENT — FIBROSIS 4 INDEX: FIB4 SCORE: 1.14

## 2021-02-13 ENCOUNTER — HOSPITAL ENCOUNTER (OUTPATIENT)
Dept: LAB | Facility: MEDICAL CENTER | Age: 61
End: 2021-02-13
Attending: FAMILY MEDICINE
Payer: COMMERCIAL

## 2021-02-13 DIAGNOSIS — E78.5 DYSLIPIDEMIA: ICD-10-CM

## 2021-02-13 LAB
ALBUMIN SERPL BCP-MCNC: 4.2 G/DL (ref 3.2–4.9)
ALBUMIN/GLOB SERPL: 1.4 G/DL
ALP SERPL-CCNC: 57 U/L (ref 30–99)
ALT SERPL-CCNC: 27 U/L (ref 2–50)
ANION GAP SERPL CALC-SCNC: 8 MMOL/L (ref 7–16)
AST SERPL-CCNC: 28 U/L (ref 12–45)
BASOPHILS # BLD AUTO: 1.3 % (ref 0–1.8)
BASOPHILS # BLD: 0.06 K/UL (ref 0–0.12)
BILIRUB SERPL-MCNC: 0.5 MG/DL (ref 0.1–1.5)
BUN SERPL-MCNC: 26 MG/DL (ref 8–22)
CALCIUM SERPL-MCNC: 9.4 MG/DL (ref 8.5–10.5)
CHLORIDE SERPL-SCNC: 105 MMOL/L (ref 96–112)
CHOLEST SERPL-MCNC: 261 MG/DL (ref 100–199)
CO2 SERPL-SCNC: 26 MMOL/L (ref 20–33)
CREAT SERPL-MCNC: 1.07 MG/DL (ref 0.5–1.4)
EOSINOPHIL # BLD AUTO: 0.43 K/UL (ref 0–0.51)
EOSINOPHIL NFR BLD: 9.1 % (ref 0–6.9)
ERYTHROCYTE [DISTWIDTH] IN BLOOD BY AUTOMATED COUNT: 44.9 FL (ref 35.9–50)
FASTING STATUS PATIENT QL REPORTED: NORMAL
GLOBULIN SER CALC-MCNC: 3 G/DL (ref 1.9–3.5)
GLUCOSE SERPL-MCNC: 96 MG/DL (ref 65–99)
HCT VFR BLD AUTO: 48.4 % (ref 42–52)
HDLC SERPL-MCNC: 62 MG/DL
HGB BLD-MCNC: 16 G/DL (ref 14–18)
IMM GRANULOCYTES # BLD AUTO: 0.01 K/UL (ref 0–0.11)
IMM GRANULOCYTES NFR BLD AUTO: 0.2 % (ref 0–0.9)
LDLC SERPL CALC-MCNC: 178 MG/DL
LYMPHOCYTES # BLD AUTO: 1.75 K/UL (ref 1–4.8)
LYMPHOCYTES NFR BLD: 37.2 % (ref 22–41)
MCH RBC QN AUTO: 31.1 PG (ref 27–33)
MCHC RBC AUTO-ENTMCNC: 33.1 G/DL (ref 33.7–35.3)
MCV RBC AUTO: 94 FL (ref 81.4–97.8)
MONOCYTES # BLD AUTO: 0.49 K/UL (ref 0–0.85)
MONOCYTES NFR BLD AUTO: 10.4 % (ref 0–13.4)
NEUTROPHILS # BLD AUTO: 1.96 K/UL (ref 1.82–7.42)
NEUTROPHILS NFR BLD: 41.8 % (ref 44–72)
NRBC # BLD AUTO: 0 K/UL
NRBC BLD-RTO: 0 /100 WBC
PLATELET # BLD AUTO: 260 K/UL (ref 164–446)
PMV BLD AUTO: 10.5 FL (ref 9–12.9)
POTASSIUM SERPL-SCNC: 4.4 MMOL/L (ref 3.6–5.5)
PROT SERPL-MCNC: 7.2 G/DL (ref 6–8.2)
RBC # BLD AUTO: 5.15 M/UL (ref 4.7–6.1)
SODIUM SERPL-SCNC: 139 MMOL/L (ref 135–145)
TRIGL SERPL-MCNC: 106 MG/DL (ref 0–149)
WBC # BLD AUTO: 4.7 K/UL (ref 4.8–10.8)

## 2021-02-13 PROCEDURE — 80061 LIPID PANEL: CPT

## 2021-02-13 PROCEDURE — 36415 COLL VENOUS BLD VENIPUNCTURE: CPT

## 2021-02-13 PROCEDURE — 80053 COMPREHEN METABOLIC PANEL: CPT

## 2021-02-13 PROCEDURE — 85025 COMPLETE CBC W/AUTO DIFF WBC: CPT

## 2021-02-13 PROCEDURE — 84153 ASSAY OF PSA TOTAL: CPT

## 2021-02-13 NOTE — PROGRESS NOTES
Virtual Visit: Established Patient   This visit was conducted via Zoom using secure and encrypted videoconferencing technology. The patient was in a private location in the state of [place].    The patient's identity was confirmed and verbal consent was obtained for this virtual visit.    Subjective:   CC:   Chief Complaint   Patient presents with   • Medication Management     Go over all medication    • Other     Cologuard    • Medication Refill     Albuterol, Sildenafil, & Flomax (Wants stronger dosage)        Dex Bynum is a 60 y.o. male presenting for evaluation and management of ongoing BPH, dyslipidemia, and erectile dysfunction.  Presents virtually today for medication refills.  Patient is doing well on his tamsulosin 0.8 mg daily.  Patient states that he still has intermittent nocturia 1 time per night occasionally will be exacerbated to 2 times per night but is requesting additional resources regarding this.  Patient is amenable to starting additional medication as he is maxed out on his tamsulosin.  Patient also has ongoing erectile dysfunction and is requesting a refill of his sildenafil.  Patient doing well with his 50 mg as needed.  ASCVD is 7.5% with counseled on updating his labs to confirm possible need for cholesterol-lowering medication.  He also states that he has intermittent asthma and is only had to use his puffer 1-2 times per year but his current albuterol inhaler is  and he is requesting a refill.    ROS   Denies any recent fevers or chills. No nausea or vomiting. No chest pains or shortness of breath.     No Known Allergies    Current medicines (including changes today)  Current Outpatient Medications   Medication Sig Dispense Refill   • finasteride (PROSCAR) 5 MG Tab Take 1 tablet by mouth every day. 30 tablet 1   • sildenafil citrate (VIAGRA) 50 MG tablet Take 1 tablet by mouth as needed for Erectile Dysfunction. 30 tablet 0   • albuterol 108 (90 Base) MCG/ACT Aero Soln  inhalation aerosol Inhale 2 Puffs every 6 hours as needed for Shortness of Breath. 8.5 g 0   • tamsulosin (FLOMAX) 0.4 MG capsule Take 2 Caps by mouth ONE-HALF HOUR AFTER BREAKFAST. 180 Cap 4     No current facility-administered medications for this visit.       Patient Active Problem List    Diagnosis Date Noted   • Mild intermittent asthma without complication 04/02/2019   • Erectile dysfunction 02/07/2019   • Benign prostatic hyperplasia with nocturia 02/07/2019   • Dyslipidemia 02/07/2019   • Overweight (BMI 25.0-29.9) 02/07/2019   • Arthritis of right shoulder region 02/07/2019       Family History   Problem Relation Age of Onset   • Lung Disease Mother         COPD/smoking   • Heart Disease Father    • Alcohol/Drug Father    • Alcohol/Drug Sister    • No Known Problems Maternal Grandmother    • Lung Disease Paternal Grandmother         COPD/Smoking       He  has a past medical history of Asthma.  He  has a past surgical history that includes ear middle exploration (Left); dental extraction(s); and eye surgery.       Objective:   Ht 1.829 m (6') Comment: per patient  Wt 89.8 kg (198 lb) Comment: Per patient  BMI 26.85 kg/m²     Physical Exam:  Constitutional: Alert, no distress, well-groomed.  Skin: No rashes in visible areas.  Eye: Round. Conjunctiva clear, lids normal. No icterus.   ENMT: Lips pink without lesions, good dentition, moist mucous membranes. Phonation normal.  Neck: No masses, no thyromegaly. Moves freely without pain.  Respiratory: Unlabored respiratory effort, no cough or audible wheeze  Psych: Alert and oriented x3, normal affect and mood.       Assessment and Plan:   The following treatment plan was discussed:     1. Erectile dysfunction, unspecified erectile dysfunction type  - sildenafil citrate (VIAGRA) 50 MG tablet; Take 1 tablet by mouth as needed for Erectile Dysfunction.  Dispense: 30 tablet; Refill: 0    2. Mild intermittent asthma without complication  - albuterol 108 (90 Base)  MCG/ACT Aero Soln inhalation aerosol; Inhale 2 Puffs every 6 hours as needed for Shortness of Breath.  Dispense: 8.5 g; Refill: 0    3. Benign prostatic hyperplasia with nocturia  - finasteride (PROSCAR) 5 MG Tab; Take 1 tablet by mouth every day.  Dispense: 30 tablet; Refill: 1    4. Screen for colon cancer  - COLOGUARD COLON CANCER SCREENING    5. Dyslipidemia  - CBC WITH DIFFERENTIAL; Future  - Comp Metabolic Panel; Future  - Lipid Profile; Future    6. Screening for prostate cancer  - PROSTATE SPECIFIC AG    New prescription for finasteride as above.  Continue tamsulosin.  Continue to risk evaluation for ASCVD with labs as above.  Follow-up pending lab results.       Please note that this dictation was created using voice recognition software. I have worked with consultants from the vendor as well as technical experts from [facility] [employer] to optimize the interface. I have made every reasonable attempt to correct obvious errors, but I expect that there are errors of grammar and possibly content that I did not discover before finalizing the note.

## 2021-02-15 ENCOUNTER — PATIENT MESSAGE (OUTPATIENT)
Dept: MEDICAL GROUP | Facility: PHYSICIAN GROUP | Age: 61
End: 2021-02-15

## 2021-02-15 DIAGNOSIS — N40.1 BENIGN PROSTATIC HYPERPLASIA WITH NOCTURIA: ICD-10-CM

## 2021-02-15 DIAGNOSIS — R35.1 BENIGN PROSTATIC HYPERPLASIA WITH NOCTURIA: ICD-10-CM

## 2021-02-16 LAB — PSA SERPL-MCNC: 0.51 NG/ML (ref 0–4)

## 2021-02-16 NOTE — PATIENT COMMUNICATION
Received request via: Pharmacy    Was the patient seen in the last year in this department? Yes 02/12/2021    Does the patient have an active prescription (recently filled or refills available) for medication(s) requested? No

## 2021-02-17 RX ORDER — TAMSULOSIN HYDROCHLORIDE 0.4 MG/1
0.8 CAPSULE ORAL
Qty: 180 CAPSULE | Refills: 0
Start: 2021-02-17

## 2021-03-15 DIAGNOSIS — Z23 NEED FOR VACCINATION: ICD-10-CM

## 2021-03-23 ENCOUNTER — IMMUNIZATION (OUTPATIENT)
Dept: FAMILY PLANNING/WOMEN'S HEALTH CLINIC | Facility: IMMUNIZATION CENTER | Age: 61
End: 2021-03-23
Attending: INTERNAL MEDICINE
Payer: COMMERCIAL

## 2021-03-23 DIAGNOSIS — Z23 ENCOUNTER FOR VACCINATION: Primary | ICD-10-CM

## 2021-03-23 DIAGNOSIS — Z23 NEED FOR VACCINATION: ICD-10-CM

## 2021-03-23 PROCEDURE — 91300 PFIZER SARS-COV-2 VACCINE: CPT | Performed by: INTERNAL MEDICINE

## 2021-03-23 PROCEDURE — 0001A PFIZER SARS-COV-2 VACCINE: CPT | Performed by: INTERNAL MEDICINE

## 2021-04-08 ENCOUNTER — PATIENT OUTREACH (OUTPATIENT)
Dept: SCHEDULING | Facility: IMAGING CENTER | Age: 61
End: 2021-04-08

## 2021-04-08 NOTE — PROGRESS NOTES
Attempt #1  Outreach for COVID vaccine 2nd Pfizer dose schedule.  Outcome: Pt wants to keep same appt

## 2021-04-16 ENCOUNTER — IMMUNIZATION (OUTPATIENT)
Dept: FAMILY PLANNING/WOMEN'S HEALTH CLINIC | Facility: IMMUNIZATION CENTER | Age: 61
End: 2021-04-16
Attending: INTERNAL MEDICINE
Payer: COMMERCIAL

## 2021-04-16 DIAGNOSIS — Z23 ENCOUNTER FOR VACCINATION: Primary | ICD-10-CM

## 2021-04-16 PROCEDURE — 91300 PFIZER SARS-COV-2 VACCINE: CPT | Performed by: INTERNAL MEDICINE

## 2021-04-16 PROCEDURE — 0002A PFIZER SARS-COV-2 VACCINE: CPT | Performed by: INTERNAL MEDICINE

## 2021-12-06 ENCOUNTER — OFFICE VISIT (OUTPATIENT)
Dept: MEDICAL GROUP | Facility: PHYSICIAN GROUP | Age: 61
End: 2021-12-06
Payer: COMMERCIAL

## 2021-12-06 VITALS
HEIGHT: 72 IN | HEART RATE: 57 BPM | OXYGEN SATURATION: 97 % | DIASTOLIC BLOOD PRESSURE: 68 MMHG | WEIGHT: 202 LBS | TEMPERATURE: 97.8 F | SYSTOLIC BLOOD PRESSURE: 96 MMHG | BODY MASS INDEX: 27.36 KG/M2

## 2021-12-06 DIAGNOSIS — N40.1 BENIGN PROSTATIC HYPERPLASIA WITH NOCTURIA: ICD-10-CM

## 2021-12-06 DIAGNOSIS — R35.1 BENIGN PROSTATIC HYPERPLASIA WITH NOCTURIA: ICD-10-CM

## 2021-12-06 DIAGNOSIS — H91.92 DECREASED HEARING OF LEFT EAR: ICD-10-CM

## 2021-12-06 DIAGNOSIS — L91.8 SKIN TAG: ICD-10-CM

## 2021-12-06 DIAGNOSIS — E78.00 PURE HYPERCHOLESTEROLEMIA: ICD-10-CM

## 2021-12-06 DIAGNOSIS — J45.20 MILD INTERMITTENT ASTHMA WITHOUT COMPLICATION: ICD-10-CM

## 2021-12-06 DIAGNOSIS — N52.9 ERECTILE DYSFUNCTION, UNSPECIFIED ERECTILE DYSFUNCTION TYPE: ICD-10-CM

## 2021-12-06 PROBLEM — M19.011 ARTHRITIS OF RIGHT SHOULDER REGION: Status: RESOLVED | Noted: 2019-02-07 | Resolved: 2021-12-06

## 2021-12-06 PROBLEM — E66.3 OVERWEIGHT (BMI 25.0-29.9): Status: RESOLVED | Noted: 2019-02-07 | Resolved: 2021-12-06

## 2021-12-06 PROCEDURE — 99204 OFFICE O/P NEW MOD 45 MIN: CPT | Performed by: STUDENT IN AN ORGANIZED HEALTH CARE EDUCATION/TRAINING PROGRAM

## 2021-12-06 ASSESSMENT — FIBROSIS 4 INDEX: FIB4 SCORE: 1.26

## 2021-12-07 NOTE — ASSESSMENT & PLAN NOTE
Chronic and ongoing ED.  Difficulty both obtaining and maintaining erections.  Currently on sildenafil 50, PRN.   Feels this dose works well for him.   Currently fairly stable, without major changes.   - continue on same meds (as above).

## 2021-12-07 NOTE — PROGRESS NOTES
New to Provider  (and [facility])      Dex Bynum is a 61 y.o. male.    Reason for visit: New patient to establish         - Prior PCP:  Dr. Edmond Bustamante ([facility])     (provider is moving).     Chief Complaint   Patient presents with   • Establish Care             Problems discussed this visit:    This note uses problem-based documentation.  Subjective HPI is included in Assessment & Plan, at bottom of note.   Problem   Decreased Hearing of Left Ear   Skin Tag   Mild Intermittent Asthma Without Complication   Erectile Dysfunction   Benign Prostatic Hyperplasia With Nocturia   HLD - Pure Hypercholesterolemia                          Past Medical History:   Diagnosis Date   • Asthma     in youth   • BPH associated with nocturia    • Decreased hearing of left ear    • HLD (hyperlipidemia)        Past Surgical History:   Procedure Laterality Date   • DENTAL EXTRACTION(S)     • EAR MIDDLE EXPLORATION Left     stapes fusion   • EYE SURGERY      LASIK       Family History   Problem Relation Age of Onset   • Lung Disease Mother         COPD/smoking   • Heart Disease Father         heart dz in his 30's   • Alcohol/Drug Father    • Alcohol/Drug Sister    • No Known Problems Maternal Grandmother    • Lung Disease Paternal Grandmother         COPD/Smoking       Social History     Tobacco Use   • Smoking status: Never Smoker   • Smokeless tobacco: Never Used   Substance Use Topics   • Alcohol use: Yes     Comment: 2 per week       Current medications:  (including new changes):  Current Outpatient Medications   Medication Sig Dispense Refill   • finasteride (PROSCAR) 5 MG Tab Take 1 tablet by mouth every day. 90 tablet 4   • tamsulosin (FLOMAX) 0.4 MG capsule TAKE 2 CAPSULES BY MOUTH ONCE DAILY, 30 MINUTES AFTER BREAKFAST. 180 capsule 4   • sildenafil citrate (VIAGRA) 50 MG tablet Take 1 tablet by mouth as needed for Erectile Dysfunction. 30 tablet 0   • albuterol 108 (90 Base) MCG/ACT Aero Soln inhalation  aerosol Inhale 2 Puffs every 6 hours as needed for Shortness of Breath. 8.5 g 0     No current facility-administered medications for this visit.       Allergies as of 12/06/2021   • (No Known Allergies)                    Review of Symptoms  (as noted elsewhere, and .....)    GEN/CONST:   Denies fever, chills, fatigue,    CARDIO:   Denies chest pain, palpitations, or edema.    RESP:   Denies shortness of breath, wheezing, or coughing.  GI:    Denies nausea, vomiting, diarrhea,     :   Denies dysuria, hematuria,        + nocturia.   MSK:  Denies joint pains  or muscle aches.    NEURO:  Denies numbness or focal weakness.       PSYCH:  Denies anxiety, depression,       Physical Exam  BP (!) 96/68 (BP Location: Left arm, Patient Position: Sitting, BP Cuff Size: Large adult)   Pulse (!) 57   Temp 36.6 °C (97.8 °F) (Temporal)   Ht 1.829 m (6')   Wt 91.6 kg (202 lb)   SpO2 97%   BMI 27.40 kg/m²   General:  Alert and oriented, No apparent distress.  Neck: Supple. No lymphadenopathy noted. Thyroid not enlarged.     + small dark skin tag on anterior right mid-neck.    Lungs: Clear to auscultation bilaterally.  No wheezes or rales.     Cardiovascular: Regular rate (~64) and rhythm.  No murmurs, or gallops.  Abdomen:  Soft.  Non-distended.  Non-tender.     Extremities: No leg edema.  Good general motion of extremities.    Psychological: Appears to have normal mood and affect.      Labs:  Recent / Last-Prior labs reviewed.     CBC, CMP, Lipids and PSA                             Assessment & Plan  (with subjective history and orders):  Of note, the list below is not in a specific nor sortable order.      Problem List Items Addressed This Visit     Benign prostatic hyperplasia with nocturia     Chronic and ongoing BPH, with nocturia.  Currently on finasteride 5, and tamsulolin 0.4.  Notes nocturia, once a night.  Denies any hesitancy, dysuria, hematuria.  Currently fairly stable, without major changes.   - continue on  same meds (as above).   - can get PSA occasionally.         Relevant Orders    CBC WITH DIFFERENTIAL    Comp Metabolic Panel    PROSTATE SPECIFIC AG DIAGNOSTIC    Decreased hearing of left ear     Function ongoing decreased hearing of the left ear.  Notes prior surgery for stapes calcification.  Currently a left hearing aid.  States hearing in right ear is okay.  - Would like new audiology exam.     (requesting referral to [facility])     (Dr. Luciana Perez), (has seen before).   - Referral placed.         Relevant Orders    Referral to Audiology    Erectile dysfunction     Chronic and ongoing ED.  Difficulty both obtaining and maintaining erections.  Currently on sildenafil 50, PRN.   Feels this dose works well for him.   Currently fairly stable, without major changes.   - continue on same meds (as above).          Relevant Orders    CBC WITH DIFFERENTIAL    Comp Metabolic Panel    PROSTATE SPECIFIC AG DIAGNOSTIC    HLD - Pure Hypercholesterolemia     Chronic and ongoing HLD.  Currently not on medications.  LDL - 178, and ascvd ~ 6.2%  Currently fairly stable, without major changes.   Denies:  angina, palpitations, or dyspnea   - patient prefers to hold-off on medication for now.   - can check labs periodically.          Relevant Orders    Comp Metabolic Panel    Lipid Profile    Mild intermittent asthma without complication     Chronic, but intermittent, asthma.  More with cold weather, or exercise.   No recent exacerbation, nor need for albuterol.   Has albuterol - PRN, just in-case.   Currently fairly stable, without major changes.   - continue on PRN-albuterol (if/when needed).           Relevant Orders    CBC WITH DIFFERENTIAL    Skin tag     Chronic and ongoing darkening of skin tag on neck.  On anterior of right mid neck.  No irritation of bleeding.   - Discussed removal, or freezing.   - He declines for now.   - can follow-up in future.             Of note, the above list is not in a specific nor  sortable order.                  Diagnosis Table, with orders:  1. Benign prostatic hyperplasia with nocturia  CBC WITH DIFFERENTIAL    Comp Metabolic Panel    PROSTATE SPECIFIC AG DIAGNOSTIC   2. Erectile dysfunction, unspecified erectile dysfunction type  CBC WITH DIFFERENTIAL    Comp Metabolic Panel    PROSTATE SPECIFIC AG DIAGNOSTIC   3. Pure hypercholesterolemia  Comp Metabolic Panel    Lipid Profile   4. Skin tag     5. Mild intermittent asthma without complication  CBC WITH DIFFERENTIAL   6. Decreased hearing of left ear  Referral to Audiology                 Follow-up:  11 weeks (annual preventative).                 A computerized dictation system may have been used in this note.    Despite review, there may be some errors in spelling or grammar.    Pablo Hancock M.D.  12/6/2021

## 2021-12-07 NOTE — PATIENT INSTRUCTIONS
Please get the labs in about 10 weeks (on/AFTER 2/13/2022).  Please get the labs done at least a week prior to your next visit.    Please get them done while fasting   (no food, coffee, or juices, for 8 hours - but water is ok).

## 2021-12-07 NOTE — ASSESSMENT & PLAN NOTE
Chronic, but intermittent, asthma.  More with cold weather, or exercise.   No recent exacerbation, nor need for albuterol.   Has albuterol - PRN, just in-case.   Currently fairly stable, without major changes.   - continue on PRN-albuterol (if/when needed).

## 2021-12-07 NOTE — ASSESSMENT & PLAN NOTE
Chronic and ongoing HLD.  Currently not on medications.  LDL - 178, and ascvd ~ 6.2%  Currently fairly stable, without major changes.   Denies:  angina, palpitations, or dyspnea   - patient prefers to hold-off on medication for now.   - can check labs periodically.

## 2021-12-07 NOTE — ASSESSMENT & PLAN NOTE
Chronic and ongoing BPH, with nocturia.  Currently on finasteride 5, and tamsulolin 0.4.  Notes nocturia, once a night.  Denies any hesitancy, dysuria, hematuria.  Currently fairly stable, without major changes.   - continue on same meds (as above).   - can get PSA occasionally.

## 2021-12-07 NOTE — ASSESSMENT & PLAN NOTE
Chronic and ongoing darkening of skin tag on neck.  On anterior of right mid neck.  No irritation of bleeding.   - Discussed removal, or freezing.   - He declines for now.   - can follow-up in future.

## 2021-12-07 NOTE — ASSESSMENT & PLAN NOTE
Function ongoing decreased hearing of the left ear.  Notes prior surgery for stapes calcification.  Currently a left hearing aid.  States hearing in right ear is okay.  - Would like new audiology exam.     (requesting referral to [facility])     (Dr. Luciana Perez), (has seen before).   - Referral placed.

## 2022-02-14 ENCOUNTER — HOSPITAL ENCOUNTER (OUTPATIENT)
Dept: LAB | Facility: MEDICAL CENTER | Age: 62
End: 2022-02-14
Attending: STUDENT IN AN ORGANIZED HEALTH CARE EDUCATION/TRAINING PROGRAM
Payer: COMMERCIAL

## 2022-02-14 DIAGNOSIS — J45.20 MILD INTERMITTENT ASTHMA WITHOUT COMPLICATION: ICD-10-CM

## 2022-02-14 DIAGNOSIS — N40.1 BENIGN PROSTATIC HYPERPLASIA WITH NOCTURIA: ICD-10-CM

## 2022-02-14 DIAGNOSIS — R35.1 BENIGN PROSTATIC HYPERPLASIA WITH NOCTURIA: ICD-10-CM

## 2022-02-14 DIAGNOSIS — E78.00 PURE HYPERCHOLESTEROLEMIA: ICD-10-CM

## 2022-02-14 DIAGNOSIS — N52.9 ERECTILE DYSFUNCTION, UNSPECIFIED ERECTILE DYSFUNCTION TYPE: ICD-10-CM

## 2022-02-14 LAB
ALBUMIN SERPL BCP-MCNC: 3.9 G/DL (ref 3.2–4.9)
ALBUMIN/GLOB SERPL: 1.4 G/DL
ALP SERPL-CCNC: 58 U/L (ref 30–99)
ALT SERPL-CCNC: 22 U/L (ref 2–50)
ANION GAP SERPL CALC-SCNC: 9 MMOL/L (ref 7–16)
AST SERPL-CCNC: 24 U/L (ref 12–45)
BASOPHILS # BLD AUTO: 1.7 % (ref 0–1.8)
BASOPHILS # BLD: 0.08 K/UL (ref 0–0.12)
BILIRUB SERPL-MCNC: 0.4 MG/DL (ref 0.1–1.5)
BUN SERPL-MCNC: 25 MG/DL (ref 8–22)
CALCIUM SERPL-MCNC: 9.3 MG/DL (ref 8.5–10.5)
CHLORIDE SERPL-SCNC: 106 MMOL/L (ref 96–112)
CHOLEST SERPL-MCNC: 245 MG/DL (ref 100–199)
CO2 SERPL-SCNC: 25 MMOL/L (ref 20–33)
CREAT SERPL-MCNC: 0.98 MG/DL (ref 0.5–1.4)
EOSINOPHIL # BLD AUTO: 0.47 K/UL (ref 0–0.51)
EOSINOPHIL NFR BLD: 10.2 % (ref 0–6.9)
ERYTHROCYTE [DISTWIDTH] IN BLOOD BY AUTOMATED COUNT: 44.5 FL (ref 35.9–50)
FASTING STATUS PATIENT QL REPORTED: NORMAL
GLOBULIN SER CALC-MCNC: 2.8 G/DL (ref 1.9–3.5)
GLUCOSE SERPL-MCNC: 100 MG/DL (ref 65–99)
HCT VFR BLD AUTO: 44.5 % (ref 42–52)
HDLC SERPL-MCNC: 60 MG/DL
HGB BLD-MCNC: 15 G/DL (ref 14–18)
IMM GRANULOCYTES # BLD AUTO: 0.01 K/UL (ref 0–0.11)
IMM GRANULOCYTES NFR BLD AUTO: 0.2 % (ref 0–0.9)
LDLC SERPL CALC-MCNC: 166 MG/DL
LYMPHOCYTES # BLD AUTO: 1.48 K/UL (ref 1–4.8)
LYMPHOCYTES NFR BLD: 32.1 % (ref 22–41)
MCH RBC QN AUTO: 31.1 PG (ref 27–33)
MCHC RBC AUTO-ENTMCNC: 33.7 G/DL (ref 33.7–35.3)
MCV RBC AUTO: 92.1 FL (ref 81.4–97.8)
MONOCYTES # BLD AUTO: 0.45 K/UL (ref 0–0.85)
MONOCYTES NFR BLD AUTO: 9.8 % (ref 0–13.4)
NEUTROPHILS # BLD AUTO: 2.12 K/UL (ref 1.82–7.42)
NEUTROPHILS NFR BLD: 46 % (ref 44–72)
NRBC # BLD AUTO: 0 K/UL
NRBC BLD-RTO: 0 /100 WBC
PLATELET # BLD AUTO: 272 K/UL (ref 164–446)
PMV BLD AUTO: 10.3 FL (ref 9–12.9)
POTASSIUM SERPL-SCNC: 4.5 MMOL/L (ref 3.6–5.5)
PROT SERPL-MCNC: 6.7 G/DL (ref 6–8.2)
PSA SERPL-MCNC: 0.29 NG/ML (ref 0–4)
RBC # BLD AUTO: 4.83 M/UL (ref 4.7–6.1)
SODIUM SERPL-SCNC: 140 MMOL/L (ref 135–145)
TRIGL SERPL-MCNC: 97 MG/DL (ref 0–149)
WBC # BLD AUTO: 4.6 K/UL (ref 4.8–10.8)

## 2022-02-14 PROCEDURE — 36415 COLL VENOUS BLD VENIPUNCTURE: CPT

## 2022-02-14 PROCEDURE — 85025 COMPLETE CBC W/AUTO DIFF WBC: CPT

## 2022-02-14 PROCEDURE — 80061 LIPID PANEL: CPT

## 2022-02-14 PROCEDURE — 80053 COMPREHEN METABOLIC PANEL: CPT

## 2022-02-14 PROCEDURE — 84153 ASSAY OF PSA TOTAL: CPT

## 2022-02-23 ENCOUNTER — OFFICE VISIT (OUTPATIENT)
Dept: MEDICAL GROUP | Facility: PHYSICIAN GROUP | Age: 62
End: 2022-02-23
Payer: COMMERCIAL

## 2022-02-23 VITALS
BODY MASS INDEX: 27.9 KG/M2 | HEART RATE: 70 BPM | RESPIRATION RATE: 12 BRPM | TEMPERATURE: 97 F | DIASTOLIC BLOOD PRESSURE: 68 MMHG | OXYGEN SATURATION: 94 % | HEIGHT: 72 IN | SYSTOLIC BLOOD PRESSURE: 98 MMHG | WEIGHT: 206 LBS

## 2022-02-23 DIAGNOSIS — J45.20 MILD INTERMITTENT ASTHMA WITHOUT COMPLICATION: ICD-10-CM

## 2022-02-23 DIAGNOSIS — N40.1 BENIGN PROSTATIC HYPERPLASIA WITH NOCTURIA: ICD-10-CM

## 2022-02-23 DIAGNOSIS — E78.00 PURE HYPERCHOLESTEROLEMIA: ICD-10-CM

## 2022-02-23 DIAGNOSIS — Z00.00 HEALTHCARE MAINTENANCE: ICD-10-CM

## 2022-02-23 DIAGNOSIS — R35.1 BENIGN PROSTATIC HYPERPLASIA WITH NOCTURIA: ICD-10-CM

## 2022-02-23 DIAGNOSIS — Z00.00 HEALTH MAINTENANCE EXAMINATION: ICD-10-CM

## 2022-02-23 PROCEDURE — 99396 PREV VISIT EST AGE 40-64: CPT | Performed by: STUDENT IN AN ORGANIZED HEALTH CARE EDUCATION/TRAINING PROGRAM

## 2022-02-23 ASSESSMENT — FIBROSIS 4 INDEX: FIB4 SCORE: 1.15

## 2022-02-23 ASSESSMENT — PATIENT HEALTH QUESTIONNAIRE - PHQ9: CLINICAL INTERPRETATION OF PHQ2 SCORE: 0

## 2022-02-23 NOTE — PATIENT INSTRUCTIONS
"Below are some guidelines for managing your cholesterol levels.     1- Reduce saturated fats-these can be found in red meats and full-fat dairy products, these raise your cholesterol.  By decreasing your consumption of saturated fats they can reduce your low-density lipoprotein's (LDL's) which are the \"bad cholesterol\".    2- Eliminate transfats.  Transfats often times are listed as \"partially hydrogenated vegetable oil\" often used in margins and store-bought cookies, crackers and cakes these raise your overall cholesterol levels.    3- Eat rich foods in omega-3 fatty acids-omega-3 fatty acids do not affect the LDL cholesterol but they have other healthy benefits including reducing blood pressure. These can be found in foods such salmon, hearing, walnuts and flaxseeds.    4- Increase you soluble fiber-soluble fiber can reduce absorption of cholesterol in your bloodstream is can be found in oatmeal, kidney beans, [place] sprouts, apples and pears.    5- Exercise most days of the week and increase your physical activity.  Moderate physical activity can help raise your high density lipoprotein's (HDL's) which is the happy cholesterol.  Exercising 5 times a week for 30 minutes a day equals 150 minutes of regular activity.  Taking a brisk walk,  riding a bicycle or even playing sport you enjoy can help this.    6- Reduce your triglycerides by utilizing a low carbohydrate diet or even a Mediterranean diet as well as being active.    7-Quit smoking.  Quitting smoking improves your HDL cholesterol levels.    8- Lose weight -Carrying even just a few extra pounds can contribute to high cholesterol.      9- Avoidance of sugar, alcohol, and fatty/fried food will help to bring these levels back to normal.      "

## 2022-02-23 NOTE — PROGRESS NOTES
Established Patient     Dex Bynum is a 61 y.o. male.    Chief Complaint   Patient presents with   • Annual Exam     preventative   • Lab Results               Problems addressed this visit:     This note uses problem-based documentation.  Subjective HPI is included in Assessment & Plan, at bottom of note.   Problem   Healthcare Maintenance   Mild Intermittent Asthma Without Complication   Benign Prostatic Hyperplasia With Nocturia   HLD - Pure Hypercholesterolemia                           Past Medical History:   Diagnosis Date   • Asthma     in youth   • BPH associated with nocturia    • Decreased hearing of left ear    • HLD (hyperlipidemia)        Patient Active Problem List   Diagnosis   • Erectile dysfunction   • Benign prostatic hyperplasia with nocturia   • HLD - Pure Hypercholesterolemia   • Mild intermittent asthma without complication   • Decreased hearing of left ear   • Skin tag   • Healthcare maintenance       Past Surgical History:   Procedure Laterality Date   • DENTAL EXTRACTION(S)     • EAR MIDDLE EXPLORATION Left     stapes fusion   • EYE SURGERY      LASIK       Family History   Problem Relation Age of Onset   • Lung Disease Mother         COPD/smoking   • Heart Disease Father         heart dz in his 30's   • Alcohol/Drug Father    • Alcohol/Drug Sister    • No Known Problems Maternal Grandmother    • Lung Disease Paternal Grandmother         COPD/Smoking       Social History     Tobacco Use   • Smoking status: Never Smoker   • Smokeless tobacco: Never Used   Substance Use Topics   • Alcohol use: Yes     Comment: 2 per week       Current medications:  (including new changes):  Current Outpatient Medications   Medication Sig Dispense Refill   • finasteride (PROSCAR) 5 MG Tab Take 1 tablet by mouth every day. 90 tablet 4   • tamsulosin (FLOMAX) 0.4 MG capsule TAKE 2 CAPSULES BY MOUTH ONCE DAILY, 30 MINUTES AFTER BREAKFAST. 180 capsule 4   • sildenafil citrate (VIAGRA) 50 MG tablet Take 1 tablet  by mouth as needed for Erectile Dysfunction. 30 tablet 0   • albuterol 108 (90 Base) MCG/ACT Aero Soln inhalation aerosol Inhale 2 Puffs every 6 hours as needed for Shortness of Breath. 8.5 g 0     No current facility-administered medications for this visit.       Allergies as of 02/23/2022   • (No Known Allergies)                   Review of Symptoms   (as noted elsewhere, and .....)   GEN/CONST:   Denies fever, chills,    CARDIO:   Denies chest pain, or edema.    RESP:   Denies shortness of breath, or coughing.  GI:    Denies nausea, vomiting, diarrhea,      PSYCH:  Denies anxiety, depression,           Physical Exam  BP (!) 98/68 (BP Location: Right arm, Patient Position: Sitting, BP Cuff Size: Adult)   Pulse 70   Temp 36.1 °C (97 °F) (Temporal)   Resp 12   Ht 1.829 m (6')   Wt 93.4 kg (206 lb)   SpO2 94%   BMI 27.94 kg/m²   General:  Alert and oriented, No apparent distress.    Lungs: Clear to auscultation bilaterally.  No wheezes or rales.  Cardiovascular: Regular rate and rhythm.  No murmurs, rubs or gallops.  Abdomen:  Soft.  Non-tender.  No rebound or guarding.   Extremities:  No leg edema.  Good general motion of extremities.   Psychological: Appears to have normal mood and affect.        Labs:  Recent / Last-Prior labs reviewed.    CBC, CMP, Lipids and PSA                             Assessment & Plan  (with subjective history and orders):  Of note, the list below is not in a specific nor sortable order.      Problem List Items Addressed This Visit     Benign prostatic hyperplasia with nocturia     Chronic and ongoing BPH, with nocturia.  Currently on finasteride 5, and tamsulolin 0.4.  Notes nocturia, once a night.  Denies any hesitancy, dysuria, hematuria.  Currently fairly stable, without major changes.   - continue on same meds (as above).   - can get PSA occasionally.          Healthcare maintenance     influ vaccine - 12/2021  Pneumo Vaccine - done 10/2019...   ... can get again after 65  yo....  Tetanus - 11/2020   Shingles - done (2020).   ColoGuard - 3/2021 - Normal  (for 3 yrs)....  Hep.C.screen - done/negative.   PSA - normal (2/2022)   Dexa - n/a  Labs < 12 mo / met screen - reviewed (2/2022)....           HLD - Pure Hypercholesterolemia     Chronic and ongoing HLD.  Currently not on medications.  LDL - 166, and ascvd ~ 6%  Currently fairly stable, without major changes.   Denies:  angina, palpitations, or dyspnea   - discussed optional use/role of medication.  - patient prefers to hold-off on medication for now.   - can check labs periodically.           Mild intermittent asthma without complication     Chronic, but intermittent, asthma.  More with cold weather, or exercise.   No recent exacerbation, nor need for albuterol.   Has albuterol - PRN, just in-case.   Currently fairly stable, without major changes.   - continue on PRN-albuterol (if/when needed).            Other Visit Diagnoses     Health maintenance examination            Of note, the above list is not in a specific nor sortable order.                  Diagnosis Table, with orders:  1. Health maintenance examination     2. Healthcare maintenance     3. Benign prostatic hyperplasia with nocturia     4. HLD - Pure Hypercholesterolemia     5. Mild intermittent asthma without complication                   Follow-up:  as needed or in 6-12 months.              A computerized dictation system may have been used in this note.    Despite review, there may be some errors in spelling or grammar.    Pablo Hancock M.D.  2/23/2022

## 2022-02-24 NOTE — ASSESSMENT & PLAN NOTE
influ vaccine - 12/2021  Pneumo Vaccine - done 10/2019...   ... can get again after 64 yo....  Tetanus - 11/2020   Shingles - done (2020).   ColoGuard - 3/2021 - Normal  (for 3 yrs)....  Hep.C.screen - done/negative.   PSA - normal (2/2022)   Dexa - n/a  Labs < 12 mo / met screen - reviewed (2/2022)....

## 2022-02-24 NOTE — ASSESSMENT & PLAN NOTE
Chronic and ongoing HLD.  Currently not on medications.  LDL - 166, and ascvd ~ 6%  Currently fairly stable, without major changes.   Denies:  angina, palpitations, or dyspnea   - discussed optional use/role of medication.  - patient prefers to hold-off on medication for now.   - can check labs periodically.

## 2022-04-08 ENCOUNTER — PATIENT MESSAGE (OUTPATIENT)
Dept: MEDICAL GROUP | Facility: PHYSICIAN GROUP | Age: 62
End: 2022-04-08
Payer: COMMERCIAL

## 2022-04-08 DIAGNOSIS — J45.20 MILD INTERMITTENT ASTHMA WITHOUT COMPLICATION: ICD-10-CM

## 2022-04-08 DIAGNOSIS — R35.1 BENIGN PROSTATIC HYPERPLASIA WITH NOCTURIA: ICD-10-CM

## 2022-04-08 DIAGNOSIS — N40.1 BENIGN PROSTATIC HYPERPLASIA WITH NOCTURIA: ICD-10-CM

## 2022-04-08 DIAGNOSIS — N52.9 ERECTILE DYSFUNCTION, UNSPECIFIED ERECTILE DYSFUNCTION TYPE: ICD-10-CM

## 2022-04-08 RX ORDER — ALBUTEROL SULFATE 90 UG/1
2 AEROSOL, METERED RESPIRATORY (INHALATION) EVERY 6 HOURS PRN
Qty: 8.5 G | Refills: 2 | Status: SHIPPED | OUTPATIENT
Start: 2022-04-08

## 2022-04-08 RX ORDER — FINASTERIDE 5 MG/1
5 TABLET, FILM COATED ORAL DAILY
Qty: 90 TABLET | Refills: 1 | Status: SHIPPED | OUTPATIENT
Start: 2022-04-08 | End: 2022-08-29 | Stop reason: SDUPTHER

## 2022-04-08 RX ORDER — SILDENAFIL 50 MG/1
50 TABLET, FILM COATED ORAL
Qty: 30 TABLET | Refills: 0 | Status: SHIPPED | OUTPATIENT
Start: 2022-04-08

## 2022-04-08 RX ORDER — TAMSULOSIN HYDROCHLORIDE 0.4 MG/1
0.4 CAPSULE ORAL DAILY
Qty: 90 CAPSULE | Refills: 1 | Status: SHIPPED | OUTPATIENT
Start: 2022-04-08 | End: 2022-04-09 | Stop reason: SDUPTHER

## 2022-04-08 NOTE — PATIENT COMMUNICATION
Received request via: Patient    Was the patient seen in the last year in this department? Yes    Does the patient have an active prescription (recently filled or refills available) for medication(s) requested? No     Patient requested refill of all four prescriptions as they have all .

## 2022-04-09 RX ORDER — TAMSULOSIN HYDROCHLORIDE 0.4 MG/1
0.8 CAPSULE ORAL DAILY
Qty: 180 CAPSULE | Refills: 1 | Status: SHIPPED | OUTPATIENT
Start: 2022-04-09 | End: 2022-08-17 | Stop reason: SDUPTHER

## 2022-04-09 NOTE — ASSESSMENT & PLAN NOTE
Chronic and ongoing BPH, with nocturia.  Currently on finasteride 5, and tamsulolin 0.8.  Notes nocturia, once a night.  Denies any hesitancy, dysuria, hematuria.  Currently fairly stable, without major changes.   - continue on same meds (as above).   - can get PSA occasionally.

## 2022-08-17 DIAGNOSIS — N40.1 BENIGN PROSTATIC HYPERPLASIA WITH NOCTURIA: ICD-10-CM

## 2022-08-17 DIAGNOSIS — R35.1 BENIGN PROSTATIC HYPERPLASIA WITH NOCTURIA: ICD-10-CM

## 2022-08-17 NOTE — TELEPHONE ENCOUNTER
Patient is requesting refill on Tamsulosin as dg [facility] dispense as alyson chavarria is no longer with [facility]. This is the patient that germania asked us about.   
immunizations/infection prevention/Safe Sleep/visitors/when to call care provider/signs of jaundice

## 2022-08-18 RX ORDER — TAMSULOSIN HYDROCHLORIDE 0.4 MG/1
0.8 CAPSULE ORAL DAILY
Qty: 180 CAPSULE | Refills: 0 | Status: SHIPPED | OUTPATIENT
Start: 2022-08-18

## 2022-08-28 SDOH — ECONOMIC STABILITY: FOOD INSECURITY: WITHIN THE PAST 12 MONTHS, YOU WORRIED THAT YOUR FOOD WOULD RUN OUT BEFORE YOU GOT MONEY TO BUY MORE.: NEVER TRUE

## 2022-08-28 SDOH — ECONOMIC STABILITY: TRANSPORTATION INSECURITY
IN THE PAST 12 MONTHS, HAS LACK OF TRANSPORTATION KEPT YOU FROM MEETINGS, WORK, OR FROM GETTING THINGS NEEDED FOR DAILY LIVING?: NO

## 2022-08-28 SDOH — ECONOMIC STABILITY: HOUSING INSECURITY: IN THE LAST 12 MONTHS, HOW MANY PLACES HAVE YOU LIVED?: 1

## 2022-08-28 SDOH — ECONOMIC STABILITY: INCOME INSECURITY: IN THE LAST 12 MONTHS, WAS THERE A TIME WHEN YOU WERE NOT ABLE TO PAY THE MORTGAGE OR RENT ON TIME?: NO

## 2022-08-28 SDOH — ECONOMIC STABILITY: FOOD INSECURITY: WITHIN THE PAST 12 MONTHS, THE FOOD YOU BOUGHT JUST DIDN'T LAST AND YOU DIDN'T HAVE MONEY TO GET MORE.: NEVER TRUE

## 2022-08-28 SDOH — ECONOMIC STABILITY: INCOME INSECURITY: HOW HARD IS IT FOR YOU TO PAY FOR THE VERY BASICS LIKE FOOD, HOUSING, MEDICAL CARE, AND HEATING?: NOT HARD AT ALL

## 2022-08-28 SDOH — HEALTH STABILITY: PHYSICAL HEALTH: ON AVERAGE, HOW MANY DAYS PER WEEK DO YOU ENGAGE IN MODERATE TO STRENUOUS EXERCISE (LIKE A BRISK WALK)?: 3 DAYS

## 2022-08-28 SDOH — HEALTH STABILITY: PHYSICAL HEALTH: ON AVERAGE, HOW MANY MINUTES DO YOU ENGAGE IN EXERCISE AT THIS LEVEL?: 60 MIN

## 2022-08-28 ASSESSMENT — LIFESTYLE VARIABLES
HOW OFTEN DO YOU HAVE SIX OR MORE DRINKS ON ONE OCCASION: NEVER
HOW OFTEN DO YOU HAVE A DRINK CONTAINING ALCOHOL: 2-4 TIMES A MONTH
SKIP TO QUESTIONS 9-10: 1
AUDIT-C TOTAL SCORE: 2
HOW MANY STANDARD DRINKS CONTAINING ALCOHOL DO YOU HAVE ON A TYPICAL DAY: 1 OR 2

## 2022-08-28 ASSESSMENT — SOCIAL DETERMINANTS OF HEALTH (SDOH)
HOW HARD IS IT FOR YOU TO PAY FOR THE VERY BASICS LIKE FOOD, HOUSING, MEDICAL CARE, AND HEATING?: NOT HARD AT ALL
DO YOU BELONG TO ANY CLUBS OR ORGANIZATIONS SUCH AS CHURCH GROUPS UNIONS, FRATERNAL OR ATHLETIC GROUPS, OR SCHOOL GROUPS?: NO
HOW OFTEN DO YOU ATTEND CHURCH OR RELIGIOUS SERVICES?: NEVER
WITHIN THE PAST 12 MONTHS, YOU WORRIED THAT YOUR FOOD WOULD RUN OUT BEFORE YOU GOT THE MONEY TO BUY MORE: NEVER TRUE
HOW MANY DRINKS CONTAINING ALCOHOL DO YOU HAVE ON A TYPICAL DAY WHEN YOU ARE DRINKING: 1 OR 2
IN A TYPICAL WEEK, HOW MANY TIMES DO YOU TALK ON THE PHONE WITH FAMILY, FRIENDS, OR NEIGHBORS?: ONCE A WEEK
HOW OFTEN DO YOU HAVE A DRINK CONTAINING ALCOHOL: 2-4 TIMES A MONTH
HOW OFTEN DO YOU GET TOGETHER WITH FRIENDS OR RELATIVES?: ONCE A WEEK
DO YOU BELONG TO ANY CLUBS OR ORGANIZATIONS SUCH AS CHURCH GROUPS UNIONS, FRATERNAL OR ATHLETIC GROUPS, OR SCHOOL GROUPS?: NO
HOW OFTEN DO YOU HAVE SIX OR MORE DRINKS ON ONE OCCASION: NEVER
IN A TYPICAL WEEK, HOW MANY TIMES DO YOU TALK ON THE PHONE WITH FAMILY, FRIENDS, OR NEIGHBORS?: ONCE A WEEK
HOW OFTEN DO YOU ATTEND CHURCH OR RELIGIOUS SERVICES?: NEVER
HOW OFTEN DO YOU GET TOGETHER WITH FRIENDS OR RELATIVES?: ONCE A WEEK

## 2022-08-29 ENCOUNTER — OFFICE VISIT (OUTPATIENT)
Dept: MEDICAL GROUP | Facility: PHYSICIAN GROUP | Age: 62
End: 2022-08-29
Payer: COMMERCIAL

## 2022-08-29 VITALS
HEIGHT: 72 IN | HEART RATE: 65 BPM | RESPIRATION RATE: 18 BRPM | SYSTOLIC BLOOD PRESSURE: 112 MMHG | DIASTOLIC BLOOD PRESSURE: 70 MMHG | TEMPERATURE: 98.1 F | WEIGHT: 197.7 LBS | OXYGEN SATURATION: 96 % | BODY MASS INDEX: 26.78 KG/M2

## 2022-08-29 DIAGNOSIS — N40.1 BENIGN PROSTATIC HYPERPLASIA WITH NOCTURIA: ICD-10-CM

## 2022-08-29 DIAGNOSIS — D72.819 LEUKOPENIA, UNSPECIFIED TYPE: ICD-10-CM

## 2022-08-29 DIAGNOSIS — E78.00 PURE HYPERCHOLESTEROLEMIA: ICD-10-CM

## 2022-08-29 DIAGNOSIS — Z76.89 ENCOUNTER TO ESTABLISH CARE: ICD-10-CM

## 2022-08-29 DIAGNOSIS — R35.1 BENIGN PROSTATIC HYPERPLASIA WITH NOCTURIA: ICD-10-CM

## 2022-08-29 PROBLEM — Z00.00 HEALTHCARE MAINTENANCE: Status: RESOLVED | Noted: 2022-02-23 | Resolved: 2022-08-29

## 2022-08-29 PROBLEM — L91.8 SKIN TAG: Status: RESOLVED | Noted: 2021-12-06 | Resolved: 2022-08-29

## 2022-08-29 PROCEDURE — 99213 OFFICE O/P EST LOW 20 MIN: CPT | Performed by: FAMILY MEDICINE

## 2022-08-29 RX ORDER — FINASTERIDE 5 MG/1
5 TABLET, FILM COATED ORAL DAILY
Qty: 90 TABLET | Refills: 3 | Status: SHIPPED | OUTPATIENT
Start: 2022-08-29

## 2022-08-29 ASSESSMENT — FIBROSIS 4 INDEX: FIB4 SCORE: 1.17

## 2022-08-29 NOTE — PROGRESS NOTES
Subjective:     CC: Patient is here to go over his health history and to establish care.    HPI:   Dex presents today with the following medical concerns:    HLD - Pure Hypercholesterolemia  This is a chronic problem.  Patient tries to have a healthy lifestyle and healthy diet.  He most likely has familial hypercholesterolemia and declines a statin.  He does not really have any other risk factors for heart disease.  We will continue to follow.    Benign prostatic hyperplasia with nocturia  This is a chronic problem.  Patient is fairly well controlled on current medications.    Encounter to establish care  Patient is here today to establish care.  His general exam and labs are current.  He has no particular concerns on today's visit.    WBC decreased  This is a chronic problem.  Patient tends to have a slightly below normal white blood cell count with increased eosinophils.  No changes on review of his chart.    Past Medical History:   Diagnosis Date    Asthma     in youth    BPH associated with nocturia     Decreased hearing of left ear     HLD (hyperlipidemia)        Social History     Tobacco Use    Smoking status: Never    Smokeless tobacco: Never   Vaping Use    Vaping Use: Never used   Substance Use Topics    Alcohol use: Yes     Alcohol/week: 1.8 oz     Types: 3 Glasses of wine per week     Comment: 2 per week    Drug use: No       Current Outpatient Medications Ordered in Epic   Medication Sig Dispense Refill    finasteride (PROSCAR) 5 MG Tab Take 1 Tablet by mouth every day. 90 Tablet 3    tamsulosin (FLOMAX) 0.4 MG capsule Take 2 Capsules by mouth every day. 180 Capsule 0    albuterol 108 (90 Base) MCG/ACT Aero Soln inhalation aerosol Inhale 2 Puffs every 6 hours as needed for Shortness of Breath. 8.5 g 2    sildenafil citrate (VIAGRA) 50 MG tablet Take 1 Tablet by mouth 1 time a day as needed for Erectile Dysfunction (half-hour prior to sex). 30 Tablet 0     No current Epic-ordered facility-administered  medications on file.       Allergies:  Patient has no known allergies.    Health Maintenance: Completed    ROS:  Gen: no fevers/chills, no changes in weight  Eyes: no changes in vision  ENT: no sore throat, no hearing loss, no bloody nose  Pulm: no sob, no cough  CV: no chest pain, no palpitations  GI: no nausea/vomiting, no diarrhea  : no dysuria  MSk: no myalgias  Skin: no rash  Neuro: no headaches, no numbness/tingling  Heme/Lymph: no easy bruising      Objective:       Exam:  /70 (BP Location: Right arm, Patient Position: Sitting, BP Cuff Size: Adult)   Pulse 65   Temp 36.7 °C (98.1 °F) (Temporal)   Resp 18   Ht 1.829 m (6')   Wt 89.7 kg (197 lb 11.2 oz)   SpO2 96%   BMI 26.81 kg/m²  Body mass index is 26.81 kg/m².    Gen: Alert and oriented, No apparent distress.        Assessment & Plan:     62 y.o. male with the following -     1. Benign prostatic hyperplasia with nocturia  This is a chronic problem.  Continue current medications.  Patient was told if they ever become less effective he could consider surgical options.  - finasteride (PROSCAR) 5 MG Tab; Take 1 Tablet by mouth every day.  Dispense: 90 Tablet; Refill: 3    2. HLD - Pure Hypercholesterolemia  This is a chronic problem.  Continue healthy lifestyle.  He is told if he ever wanted to get a CT scan calcium scoring test of his heart to better stratify his risk we could do so.    3. Encounter to establish care  Patient establish care with me today.  Follow-up physical exam and labs after the first of the year.      Return in about 5 months (around 1/29/2023) for annual exam.    Please note that this dictation was created using voice recognition software. I have made every reasonable attempt to correct obvious errors, but I expect that there are errors of grammar and possibly content that I did not discover before finalizing the note.

## 2022-08-29 NOTE — ASSESSMENT & PLAN NOTE
Patient is here today to establish care.  His general exam and labs are current.  He has no particular concerns on today's visit.

## 2022-08-29 NOTE — ASSESSMENT & PLAN NOTE
This is a chronic problem.  Patient tends to have a slightly below normal white blood cell count with increased eosinophils.  No changes on review of his chart.

## 2022-08-29 NOTE — ASSESSMENT & PLAN NOTE
This is a chronic problem.  Patient tries to have a healthy lifestyle and healthy diet.  He most likely has familial hypercholesterolemia and declines a statin.  He does not really have any other risk factors for heart disease.  We will continue to follow.

## 2025-02-03 ENCOUNTER — PATIENT MESSAGE (OUTPATIENT)
Dept: HEALTH INFORMATION MANAGEMENT | Facility: OTHER | Age: 65
End: 2025-02-03